# Patient Record
Sex: FEMALE | Race: ASIAN | NOT HISPANIC OR LATINO | URBAN - METROPOLITAN AREA
[De-identification: names, ages, dates, MRNs, and addresses within clinical notes are randomized per-mention and may not be internally consistent; named-entity substitution may affect disease eponyms.]

---

## 2018-10-11 ENCOUNTER — RADIANT APPOINTMENT (OUTPATIENT)
Dept: GENERAL RADIOLOGY | Facility: CLINIC | Age: 58
End: 2018-10-11
Attending: INTERNAL MEDICINE
Payer: COMMERCIAL

## 2018-10-11 DIAGNOSIS — J45.30 MILD PERSISTENT ASTHMA WITHOUT COMPLICATION: ICD-10-CM

## 2019-06-28 ENCOUNTER — HOSPITAL ENCOUNTER (OUTPATIENT)
Dept: GENERAL RADIOLOGY | Facility: CLINIC | Age: 59
Discharge: HOME | End: 2019-06-28
Attending: INTERNAL MEDICINE | Admitting: INTERNAL MEDICINE
Payer: COMMERCIAL

## 2019-06-28 DIAGNOSIS — M25.562 LEFT KNEE PAIN: ICD-10-CM

## 2019-06-28 PROCEDURE — 73560 X-RAY EXAM OF KNEE 1 OR 2: CPT | Mod: LT

## 2019-08-05 ENCOUNTER — MEDICAL CORRESPONDENCE (OUTPATIENT)
Dept: HEALTH INFORMATION MANAGEMENT | Facility: CLINIC | Age: 59
End: 2019-08-05

## 2019-08-14 ENCOUNTER — HOSPITAL ENCOUNTER (OUTPATIENT)
Dept: ULTRASOUND IMAGING | Facility: CLINIC | Age: 59
Discharge: HOME | End: 2019-08-14
Attending: INTERNAL MEDICINE | Admitting: INTERNAL MEDICINE
Payer: COMMERCIAL

## 2019-08-14 DIAGNOSIS — N93.9 ABNORMAL UTERINE AND VAGINAL BLEEDING, UNSPECIFIED: ICD-10-CM

## 2019-08-14 LAB — RADIOLOGIST FLAGS: NORMAL

## 2019-08-14 PROCEDURE — 76830 TRANSVAGINAL US NON-OB: CPT

## 2019-09-20 ENCOUNTER — OFFICE VISIT (OUTPATIENT)
Dept: OBGYN | Facility: CLINIC | Age: 59
End: 2019-09-20
Attending: OBSTETRICS & GYNECOLOGY
Payer: COMMERCIAL

## 2019-09-20 VITALS — SYSTOLIC BLOOD PRESSURE: 130 MMHG | HEART RATE: 73 BPM | DIASTOLIC BLOOD PRESSURE: 83 MMHG

## 2019-09-20 DIAGNOSIS — N95.0 POSTMENOPAUSAL BLEEDING: Primary | ICD-10-CM

## 2019-09-20 LAB
ALBUMIN UR-MCNC: NEGATIVE MG/DL
APPEARANCE UR: CLEAR
BILIRUB UR QL STRIP: NEGATIVE
COLOR UR AUTO: YELLOW
GLUCOSE UR STRIP-MCNC: NEGATIVE MG/DL
HGB UR QL STRIP: ABNORMAL
KETONES UR STRIP-MCNC: NEGATIVE MG/DL
LEUKOCYTE ESTERASE UR QL STRIP: ABNORMAL
NITRATE UR QL: NEGATIVE
PH UR STRIP: 7.5 PH (ref 5–7)
SP GR UR STRIP: 1.01 (ref 1–1.03)
UROBILINOGEN UR STRIP-ACNC: 0.2 EU/DL (ref 0.2–1)

## 2019-09-20 PROCEDURE — G0145 SCR C/V CYTO,THINLAYER,RESCR: HCPCS | Performed by: OBSTETRICS & GYNECOLOGY

## 2019-09-20 PROCEDURE — 81003 URINALYSIS AUTO W/O SCOPE: CPT

## 2019-09-20 PROCEDURE — 88305 TISSUE EXAM BY PATHOLOGIST: CPT | Performed by: OBSTETRICS & GYNECOLOGY

## 2019-09-20 PROCEDURE — G0476 HPV COMBO ASSAY CA SCREEN: HCPCS | Performed by: OBSTETRICS & GYNECOLOGY

## 2019-09-20 PROCEDURE — G0123 SCREEN CERV/VAG THIN LAYER: HCPCS | Performed by: OBSTETRICS & GYNECOLOGY

## 2019-09-20 PROCEDURE — 58100 BIOPSY OF UTERUS LINING: CPT | Mod: ZF | Performed by: OBSTETRICS & GYNECOLOGY

## 2019-09-20 RX ORDER — LOSARTAN POTASSIUM 50 MG/1
TABLET ORAL
Refills: 3 | COMMUNITY
Start: 2018-10-11

## 2019-09-20 RX ORDER — FOLIC ACID 1 MG/1
1000 TABLET ORAL DAILY
Refills: 3 | COMMUNITY
Start: 2019-09-01

## 2019-09-20 RX ORDER — UREA 10 %
LOTION (ML) TOPICAL
Refills: 3 | COMMUNITY
Start: 2019-04-22

## 2019-09-20 RX ORDER — MOMETASONE FUROATE AND FORMOTEROL FUMARATE DIHYDRATE 100; 5 UG/1; UG/1
AEROSOL RESPIRATORY (INHALATION)
Refills: 3 | COMMUNITY
Start: 2019-09-01

## 2019-09-20 SDOH — HEALTH STABILITY: MENTAL HEALTH: HOW OFTEN DO YOU HAVE A DRINK CONTAINING ALCOHOL?: NEVER

## 2019-09-20 NOTE — PROGRESS NOTES
"Subjective:     Maria T Pineda is a 59 year old  here for evaluation of painless AUB s/p LMP ~ at age 52. Bleeding has been intermittent throughout the past 7 years. It is our understanding that she sought a provider for this intermittent bleeding earlier (approximately 2 years ago). She was diagnosed with an \"infection in the urine,\" which resolved without complications. She describes her bleeding as \"pink\" and is most noticeable when she urinates and when she wipes. She denies fevers, night sweats, hot flashes, cramping/abdominal pain, genital rashes, or dysuria. She is positive for constipation     Of note, she is from [facility]. She moved to [place] a year ago with her . She understands very little English. iPad translation services were used.     This note by Denise Gonsalez kindly documents Maria T's HPI. Please see my note for further details of this encounter.    Heide Mathur MD, MSCI  "

## 2019-09-20 NOTE — LETTER
"2019       RE: Maria T Pineda  [address]  [facility] [address]     Dear Colleague,    Thank you for referring your patient, Maria T Pineda, to the [facility] at [facility]. Please see a copy of my visit note below.    SUBJECTIVE   Maria T Pineda is a 59 year old postmenopausal  who presents with postmenopausal bleeding x 7 years. This history was taken using an over-the-phone . Maria T states her period stopped 7 years ago, but since then she has had ongoing occasional spotting or pink discharge. She notices it when she wipes after urinating. [place] from [place]. She has been seen for this issue in [place], and potentially was tested for a urinary tract infection, but does not mentions any screening for  cancers. She denies cramping/abdominal pain, fevers/sweats, dysuria, or hot flashes. She has noticed constipation [place].     Gynecologic History  No LMP recorded. Patient is postmenopausal. Last period in .     Menstrual History:  Thinks she perhaps has had 1 pap smear in [place], which was normal.    Obstetric History  OB History   No data available     2 SAB  2     Past Medical History  Past Medical History:   Diagnosis Date     Asthma     Pt describes having asthma and taking \"nasal spray\" daily for relief      Chronic knee pain     bilateral, takes ibuprofen for the pain      History of kidney problems     diagnosed and treated herbally in [place] s/p resolved per pt      Past Surgical History  No past surgical history on file.  Possibly colonoscopy (?) done in [facility] - unclear description from [facility]    Medications  Current Outpatient Medications   Medication     folic acid (FOLVITE) 1 MG tablet     losartan (COZAAR) 50 MG tablet     DULERA 100-5 MCG/ACT inhaler     omeprazole (PRILOSEC) 20 MG DR capsule     Pyridoxine HCl (VITAMIN B6) 50 MG TABS "     ranitidine (ZANTAC) 150 MG tablet     No current facility-administered medications for this visit.    reports using ibuprofen daily    Allergies   No Known Allergies    Social History  Social History     Tobacco Use     Smoking status: Never Smoker     Smokeless tobacco: Never Used   Substance Use Topics     Alcohol use: Never     Frequency: Never     Drug use: Never   [place] from [place].    Family History  Family History   Problem Relation Age of Onset     Lung Cancer Father      Ovarian Cancer Paternal Aunt    No breast or uterine cancer.    Review of Systems  C: NEGATIVE for fever, chills, unplanned weight loss  HEENT: NEGATIVE for vision changes, NEGATIVE for ear, mouth and throat problems  R: NEGATIVE for significant cough or SOB  B: NEGATIVE for masses, tenderness or discharge  CV: NEGATIVE for chest pain, palpitations   GI: NEGATIVE for nausea, abdominal pain,or heartburn  : NEGATIVE for frequency, dysuria, or hematuria, or change in bladder habits  M: NEGATIVE for significant arthralgias or myalgia  N: NEGATIVE for weakness, dizziness or paresthesias or headache  E: NEGATIVE for temperature intolerance, skin/hair changes  I: NEGATIVE for worrisome rashes, moles or lesions  P: NEGATIVE for changes in mood or affect    OBJECTIVE   /83   Pulse 73   BMI: There is no height or weight on file to calculate BMI.  General:  Alert, no distress   Head:  Normocephalic, without obvious abnormality   Lungs:  Clear to auscultation bilaterally   Heart:  Regular rate and rhythm, no murmur   Abdomen:  Soft, non-tender, non-distended, bowel sounds normal   Pelvic: -nefg  -bladder wnl, well supported  -vagina normal without discharge  -cervix normal in appearance, no masses  -uterus 5 wk size, AV, mobile, NT  -no adnexal masses, NT  -anus wnl   Extremities:  normal     Endometrial Biopsy Procedure Note    Thi and I discussed the risks and benefits of performing an in-office EMB today and Thi  agreed. We performed a time-out and confirmed Maria T's name, , and procedure. I then assisted the patient into lithotomy position. I placed a speculum into the vagina and collected a pap smear. Cervix was without lesion. I cleansed the cervix with iodine. I placed a single-toothed tenaculum on the anterior lip of the cervix. With 2 passes of the endometrial biopsy pipelle I collected scant endometrial tissue. I then removed the tenaculum. The cervix was hemostatic. I then removed the speculum. Maria T tolerated the procedure well.    Pelvic US 19:  IMPRESSION: Hypervascular intrauterine mixed echogenic mass, likely  polyp versus submucosal fibroid. However, cannot exclude neoplasm.  Further evaluation is warranted to exclude malignancy.     Urine dip in the office today: neg glu, neg ana, neg ket, trace blood, pH 7.5, SG 1.015, neg pro, neg nit, trace saravanan, 0.2 EU/dL uro (urine collected after EMB performed)    ASSESSMENT   Maria T Pineda is a 59 year old postmenopausal  here for PMB.    PLAN   (N95.0) Postmenopausal bleeding  (primary encounter diagnosis)  Comment: Maria T and I discussed that there are 4 common causes of PMB: UTI, vaginal atrophy, cervical pathology, and uterine pathology.     Plan: Obtaining, preparing and conveyance of cervical        or vaginal smear to laboratory., Pap imaged         thin layer screen with HPV - recommended age 30        - 65 years (select HPV order below),         ENDOMETRIAL BIOPSY w/o CERVICAL DILATION,         Surgical pathology exam          Urine dip w/o evidence of UTI today. No concerning vaginal lesions/atrophy. Pap smear and EMB done today to r/o pre-cancerous/cancerous lesion.     Given language barrier, Maria T will return in 2 weeks to discuss results. Will recommend operative hysteroscopy with myosure, D&C at that time if remainder of testing negative to remove endometrial polyp seen on US.     Heide Mathur MD, MSCI    Division of Obstetrics,  "Gynecology, Midwifery, and Family Planning    Subjective:     Maria T Pineda is a 59 year old  here for evaluation of painless AUB s/p LMP ~ at age 52. Bleeding has been intermittent throughout the past 7 years. It is our understanding that she sought a provider for this intermittent bleeding earlier (approximately 2 years ago). She was diagnosed with an \"infection in the urine,\" which resolved without complications. She describes her bleeding as \"pink\" and is most noticeable when she urinates and when she wipes. She denies fevers, night sweats, hot flashes, cramping/abdominal pain, genital rashes, or dysuria. She is positive for constipation     Of note, she is from [facility]. She moved to [place] a year ago with her . She understands very little English. iPad translation services were used.     This note by Denise Gonsalez kindly documents Maria T's HPI. Please see my note for further details of this encounter.    Heide Mathur MD, MSCI      "

## 2019-09-20 NOTE — PROGRESS NOTES
"SUBJECTIVE   Maria T Pineda is a 59 year old postmenopausal  who presents with postmenopausal bleeding x 7 years. This history was taken using an over-the-phone . Maria T states her period stopped 7 years ago, but since then she has had ongoing occasional spotting or pink discharge. She notices it when she wipes after urinating. [place]. She has been seen for this issue in [place], and potentially was tested for a urinary tract infection, but does not mentions any screening for  cancers. She denies cramping/abdominal pain, fevers/sweats, dysuria, or hot flashes. She has noticed constipation since [place].     Gynecologic History  No LMP recorded. Patient is postmenopausal. Last period in .     Menstrual History:  Thinks she perhaps has had 1 pap smear in [place], which was normal.    Obstetric History  OB History   No data available     2 SAB  2     Past Medical History  Past Medical History:   Diagnosis Date     Asthma     Pt describes having asthma and taking \"nasal spray\" daily for relief      Chronic knee pain     bilateral, takes ibuprofen for the pain      History of kidney problems     diagnosed and treated herbally in [place] s/p resolved per pt        Past Surgical History  No past surgical history on file.  Possibly colonoscopy (?) done in [place] - unclear description from [facility]    Medications  Current Outpatient Medications   Medication     folic acid (FOLVITE) 1 MG tablet     losartan (COZAAR) 50 MG tablet     DULERA 100-5 MCG/ACT inhaler     omeprazole (PRILOSEC) 20 MG DR capsule     Pyridoxine HCl (VITAMIN B6) 50 MG TABS     ranitidine (ZANTAC) 150 MG tablet     No current facility-administered medications for this visit.    reports using ibuprofen daily    Allergies   No Known Allergies    Social History  Social History     Tobacco Use     Smoking status: Never Smoker     Smokeless tobacco: Never Used "   Substance Use Topics     Alcohol use: Never     Frequency: Never     Drug use: Never   [place] from [place].    Family History  Family History   Problem Relation Age of Onset     Lung Cancer Father      Ovarian Cancer Paternal Aunt    No breast or uterine cancer.    Review of Systems  C: NEGATIVE for fever, chills, unplanned weight loss  HEENT: NEGATIVE for vision changes, NEGATIVE for ear, mouth and throat problems  R: NEGATIVE for significant cough or SOB  B: NEGATIVE for masses, tenderness or discharge  CV: NEGATIVE for chest pain, palpitations   GI: NEGATIVE for nausea, abdominal pain,or heartburn  : NEGATIVE for frequency, dysuria, or hematuria, or change in bladder habits  M: NEGATIVE for significant arthralgias or myalgia  N: NEGATIVE for weakness, dizziness or paresthesias or headache  E: NEGATIVE for temperature intolerance, skin/hair changes  I: NEGATIVE for worrisome rashes, moles or lesions  P: NEGATIVE for changes in mood or affect    OBJECTIVE   /83   Pulse 73   BMI: There is no height or weight on file to calculate BMI.  General:  Alert, no distress   Head:  Normocephalic, without obvious abnormality   Lungs:  Clear to auscultation bilaterally   Heart:  Regular rate and rhythm, no murmur   Abdomen:  Soft, non-tender, non-distended, bowel sounds normal   Pelvic: -nefg  -bladder wnl, well supported  -vagina normal without discharge  -cervix normal in appearance, no masses  -uterus 5 wk size, AV, mobile, NT  -no adnexal masses, NT  -anus wnl   Extremities:  normal     Endometrial Biopsy Procedure Note    Thi and I discussed the risks and benefits of performing an in-office EMB today and Thi agreed. We performed a time-out and confirmed Thi's name, , and procedure. I then assisted the patient into lithotomy position. I placed a speculum into the vagina and collected a pap smear. Cervix was without lesion. I cleansed the cervix with iodine. I placed a single-toothed tenaculum  on the anterior lip of the cervix. With 2 passes of the endometrial biopsy pipelle I collected scant endometrial tissue. I then removed the tenaculum. The cervix was hemostatic. I then removed the speculum. Maria T tolerated the procedure well.    Pelvic US 19:  IMPRESSION: Hypervascular intrauterine mixed echogenic mass, likely  polyp versus submucosal fibroid. However, cannot exclude neoplasm.  Further evaluation is warranted to exclude malignancy.     Urine dip in the office today: neg glu, neg ana, neg ket, trace blood, pH 7.5, SG 1.015, neg pro, neg nit, trace saravanan, 0.2 EU/dL uro (urine collected after EMB performed)    ASSESSMENT   Maria T Pineda is a 59 year old postmenopausal  here for PMB.    PLAN   (N95.0) Postmenopausal bleeding  (primary encounter diagnosis)  Comment: Thi and I discussed that there are 4 common causes of PMB: UTI, vaginal atrophy, cervical pathology, and uterine pathology.     Plan: Obtaining, preparing and conveyance of cervical        or vaginal smear to laboratory., Pap imaged         thin layer screen with HPV - recommended age 30        - 65 years (select HPV order below),         ENDOMETRIAL BIOPSY w/o CERVICAL DILATION,         Surgical pathology exam          Urine dip w/o evidence of UTI today. No concerning vaginal lesions/atrophy. Pap smear and EMB done today to r/o pre-cancerous/cancerous lesion.     Given language barrier, Thi will return in 2 weeks to discuss results. Will recommend operative hysteroscopy with myosure, D&C at that time if remainder of testing negative to remove endometrial polyp seen on US.     Heide Mathur MD, MSCI    Division of Obstetrics, Gynecology, Midwifery, and Family Planning

## 2019-09-25 LAB
COPATH REPORT: NORMAL
PAP: NORMAL

## 2019-09-27 LAB
FINAL DIAGNOSIS: NORMAL
HPV HR 12 DNA CVX QL NAA+PROBE: NEGATIVE
HPV16 DNA SPEC QL NAA+PROBE: NEGATIVE
HPV18 DNA SPEC QL NAA+PROBE: NEGATIVE
SPECIMEN DESCRIPTION: NORMAL
SPECIMEN SOURCE CVX/VAG CYTO: NORMAL

## 2019-09-28 LAB — COPATH REPORT: NORMAL

## 2019-10-07 ENCOUNTER — ANCILLARY PROCEDURE (OUTPATIENT)
Dept: ULTRASOUND IMAGING | Facility: CLINIC | Age: 59
End: 2019-10-07
Attending: OBSTETRICS & GYNECOLOGY
Payer: COMMERCIAL

## 2019-10-07 ENCOUNTER — OFFICE VISIT (OUTPATIENT)
Dept: OBGYN | Facility: CLINIC | Age: 59
End: 2019-10-07
Attending: OBSTETRICS & GYNECOLOGY
Payer: COMMERCIAL

## 2019-10-07 VITALS — SYSTOLIC BLOOD PRESSURE: 143 MMHG | WEIGHT: 139.1 LBS | HEART RATE: 72 BPM | DIASTOLIC BLOOD PRESSURE: 85 MMHG

## 2019-10-07 DIAGNOSIS — N95.0 POSTMENOPAUSAL BLEEDING: Primary | ICD-10-CM

## 2019-10-07 PROCEDURE — 76830 TRANSVAGINAL US NON-OB: CPT

## 2019-10-07 PROCEDURE — G0463 HOSPITAL OUTPT CLINIC VISIT: HCPCS

## 2019-10-07 ASSESSMENT — PATIENT HEALTH QUESTIONNAIRE - PHQ9
5. POOR APPETITE OR OVEREATING: NEARLY EVERY DAY
SUM OF ALL RESPONSES TO PHQ QUESTIONS 1-9: 11

## 2019-10-07 ASSESSMENT — ANXIETY QUESTIONNAIRES
6. BECOMING EASILY ANNOYED OR IRRITABLE: NOT AT ALL
2. NOT BEING ABLE TO STOP OR CONTROL WORRYING: NEARLY EVERY DAY
GAD7 TOTAL SCORE: 14
7. FEELING AFRAID AS IF SOMETHING AWFUL MIGHT HAPPEN: NEARLY EVERY DAY
1. FEELING NERVOUS, ANXIOUS, OR ON EDGE: NEARLY EVERY DAY
3. WORRYING TOO MUCH ABOUT DIFFERENT THINGS: SEVERAL DAYS
5. BEING SO RESTLESS THAT IT IS HARD TO SIT STILL: SEVERAL DAYS

## 2019-10-07 NOTE — PROGRESS NOTES
"Gyn Clinic Visit Note  10/7/2019    S: Maria T Pineda is a 59 year old her to discuss U/S results and possible surgery.  She is here with an . She had PMB.  Dr. Mathur recommended an office visit to explain the findings and to establish a plan (planned for surgery).  U/S and endo bx results below.      U/S 8/14/2019:  FINDINGS:  The uterus measures 3.3 cm x 7.8 cm x 4.4 cm. There is an  approximately 2.0 x 1.6 x 1.2 cm hypervascular mixed echogenic mass in  the mid myometrium/endometrium. The endometrial stripe is normal in  thickness measuring 3 mm adjacent to this lesion.     The right ovary demonstrates normal follicular appearance and measures  0.8 cm x 2.2 cm x 1.1 cm. The left ovary is not-well visualized. No  adnexal mass is seen.     There is no simple free fluid in the pelvis.                                                                      IMPRESSION: Hypervascular intrauterine mixed echogenic mass, likely  polyp versus submucosal fibroid. However, cannot exclude neoplasm.  Further evaluation is warranted to exclude malignancy.    9/20/2019:    FINAL DIAGNOSIS:   Endometrium, biopsy:   -Fragments of atrophic endometrium   -Superficial strips of endocervical epithelium with focal squamous   metaplasia   -Negative for hyperplasia, atypia and malignancy         O: BP (!) 143/85 (BP Location: Left arm, Patient Position: Chair)   Pulse 72   Wt 63.1 kg (139 lb 1.6 oz)   Breastfeeding? No        A/P:  Maria T Pineda is a 59 year old y/o  here today for f/u PMB: benign endo bx and abnormal pelvic U/S.  I have reviewed the pictures here and feel that the \"mass\" is most likely a intramural fibroid with minimal impingement on the cavity.  I would like the pt to RTO for another U/S in the clinic to confirm this.  If this is the case then an operative hysteroscopy would not be necessary.    In office U/S performed today: Stripe clearly seen 1.34 mm with 1.75x1.28x1.85 cm intramural fibroid; not " impinging on stripe.    In view of these findings and negative endo bx; she does not need anymore follow up. She was very happy with this.  She was told that if she experiences any more bleeding she should RTO.    I spent a total of 15  minutes face to face with Yjo  during today's office visit. Over 50% of this time was spent counseling the patient and/or coordinating care regarding her plan for PMB.

## 2019-10-07 NOTE — NURSING NOTE
Chief Complaint   Patient presents with     Follow Up     Follow up to discuss result       See DEBORAH Perez 10/7/2019

## 2019-10-07 NOTE — LETTER
"10/7/2019       RE: Maria T [address]  [facility] [id number]     Dear Colleague,    Thank you for referring your patient, Maria T Pineda, to the [facility] at [facility]. Please see a copy of my visit note below.    Gyn Clinic Visit Note  10/7/2019    S: Maria T Pineda is a 59 year old her to discuss U/S results and possible surgery.  She is here with an . She had PMB.  Dr. Mathur recommended an office visit to explain the findings and to establish a plan (planned for surgery).  U/S and endo bx results below.      U/S 8/14/2019:  FINDINGS:  The uterus measures 3.3 cm x 7.8 cm x 4.4 cm. There is an  approximately 2.0 x 1.6 x 1.2 cm hypervascular mixed echogenic mass in  the mid myometrium/endometrium. The endometrial stripe is normal in  thickness measuring 3 mm adjacent to this lesion.     The right ovary demonstrates normal follicular appearance and measures  0.8 cm x 2.2 cm x 1.1 cm. The left ovary is not-well visualized. No  adnexal mass is seen.     There is no simple free fluid in the pelvis.                                                                IMPRESSION: Hypervascular intrauterine mixed echogenic mass, likely  polyp versus submucosal fibroid. However, cannot exclude neoplasm.  Further evaluation is warranted to exclude malignancy.    9/20/2019:    FINAL DIAGNOSIS:   Endometrium, biopsy:   -Fragments of atrophic endometrium   -Superficial strips of endocervical epithelium with focal squamous   metaplasia   -Negative for hyperplasia, atypia and malignancy     O: BP (!) 143/85 (BP Location: Left arm, Patient Position: Chair)   Pulse 72   Wt 63.1 kg (139 lb 1.6 oz)   Breastfeeding? No      A/P:  Maria T Pineda is a 59 year old y/o  here today for f/u PMB: benign endo bx and abnormal pelvic U/S.  I have reviewed the pictures here and feel that the \"mass\" is most likely a intramural fibroid with minimal " impingement on the cavity.  I would like the pt to RTO for another U/S in the clinic to confirm this.  If this is the case then an operative hysteroscopy would not be necessary.    In office U/S performed today: Stripe clearly seen 1.34 mm with 1.75x1.28x1.85 cm intramural fibroid; not impinging on stripe.    In view of these findings and negative endo bx; she does not need anymore follow up. She was very happy with this.  She was told that if she experiences any more bleeding she should RTO.    I spent a total of 15  minutes face to face with Yjo  during today's office visit. Over 50% of this time was spent counseling the patient and/or coordinating care regarding her plan for PMB.      Again, thank you for allowing me to participate in the care of your patient.      Sincerely,    Sol Lau MD

## 2019-10-08 ASSESSMENT — ANXIETY QUESTIONNAIRES: GAD7 TOTAL SCORE: 14

## 2021-09-16 ENCOUNTER — HOSPITAL ENCOUNTER (OUTPATIENT)
Dept: CARDIOLOGY | Facility: CLINIC | Age: 61
Discharge: HOME | End: 2021-09-16
Attending: FAMILY MEDICINE | Admitting: FAMILY MEDICINE
Payer: COMMERCIAL

## 2021-09-16 VITALS — BODY MASS INDEX: 24.27 KG/M2 | WEIGHT: 137 LBS | HEIGHT: 63 IN

## 2021-09-16 DIAGNOSIS — R07.9 CHEST PAIN, UNSPECIFIED: ICD-10-CM

## 2021-09-16 PROCEDURE — 93321 DOPPLER ECHO F-UP/LMTD STD: CPT | Mod: 26 | Performed by: INTERNAL MEDICINE

## 2021-09-16 PROCEDURE — 93018 CV STRESS TEST I&R ONLY: CPT | Performed by: INTERNAL MEDICINE

## 2021-09-16 PROCEDURE — C8928 TTE W OR W/O FOL W/CON,STRES: HCPCS

## 2021-09-16 PROCEDURE — 255N000002 HC RX 255 OP 636: Performed by: INTERNAL MEDICINE

## 2021-09-16 PROCEDURE — 93325 DOPPLER ECHO COLOR FLOW MAPG: CPT | Mod: 26 | Performed by: INTERNAL MEDICINE

## 2021-09-16 PROCEDURE — 93321 DOPPLER ECHO F-UP/LMTD STD: CPT | Mod: TC

## 2021-09-16 PROCEDURE — 250N000011 HC RX IP 250 OP 636: Performed by: INTERNAL MEDICINE

## 2021-09-16 PROCEDURE — 250N000009 HC RX 250: Performed by: INTERNAL MEDICINE

## 2021-09-16 PROCEDURE — 93350 STRESS TTE ONLY: CPT | Mod: 26 | Performed by: INTERNAL MEDICINE

## 2021-09-16 PROCEDURE — 93016 CV STRESS TEST SUPVJ ONLY: CPT | Performed by: INTERNAL MEDICINE

## 2021-09-16 RX ORDER — DOBUTAMINE HYDROCHLORIDE 200 MG/100ML
10-50 INJECTION INTRAVENOUS CONTINUOUS
Status: SHIPPED | OUTPATIENT
Start: 2021-09-16 | End: 2021-09-16

## 2021-09-16 RX ORDER — METOPROLOL TARTRATE 1 MG/ML
1-20 INJECTION, SOLUTION INTRAVENOUS
Status: ACTIVE | OUTPATIENT
Start: 2021-09-16 | End: 2021-09-16

## 2021-09-16 RX ORDER — ATROPINE SULFATE 0.4 MG/ML
.2-2 AMPUL (ML) INJECTION
Status: COMPLETED | OUTPATIENT
Start: 2021-09-16 | End: 2021-09-16

## 2021-09-16 RX ADMIN — PERFLUTREN 8 ML: 6.52 INJECTION, SUSPENSION INTRAVENOUS at 14:09

## 2021-09-16 RX ADMIN — ATROPINE SULFATE 0.2 MG: 0.4 INJECTION, SOLUTION INTRAMUSCULAR; INTRAVENOUS; SUBCUTANEOUS at 14:05

## 2021-09-16 RX ADMIN — METOPROLOL TARTRATE 3 MG: 1 INJECTION, SOLUTION INTRAVENOUS at 14:08

## 2021-09-16 RX ADMIN — DOBUTAMINE IN DEXTROSE 10 MCG/KG/MIN: 200 INJECTION, SOLUTION INTRAVENOUS at 13:55

## 2021-09-16 ASSESSMENT — MIFFLIN-ST. JEOR: SCORE: 1165.56

## 2021-09-16 NOTE — PROGRESS NOTES
Pt here for dobutamine stress test.  Test, meds and side effects reviewed with patient.  Achieved target HR at 40 mcg Dobutamine and a total of 0.2 mg IV atropine.  Gave a total of 3 mg IV Metoprolol to bring HR back to baseline.  Post monitoring complete and VSS.  Pt escorted out to the gold waiting room.

## 2021-10-05 ENCOUNTER — ANCILLARY PROCEDURE (OUTPATIENT)
Dept: MAMMOGRAPHY | Facility: CLINIC | Age: 61
End: 2021-10-05
Attending: INTERNAL MEDICINE
Payer: COMMERCIAL

## 2021-10-05 DIAGNOSIS — Z12.31 VISIT FOR SCREENING MAMMOGRAM: ICD-10-CM

## 2021-10-05 PROCEDURE — 77067 SCR MAMMO BI INCL CAD: CPT

## 2021-10-05 PROCEDURE — 77067 SCR MAMMO BI INCL CAD: CPT | Mod: 26 | Performed by: STUDENT IN AN ORGANIZED HEALTH CARE EDUCATION/TRAINING PROGRAM

## 2021-11-10 ENCOUNTER — LAB REQUISITION (OUTPATIENT)
Dept: LAB | Facility: CLINIC | Age: 61
End: 2021-11-10
Payer: COMMERCIAL

## 2021-11-10 DIAGNOSIS — Z00.00 ENCOUNTER FOR GENERAL ADULT MEDICAL EXAMINATION WITHOUT ABNORMAL FINDINGS: ICD-10-CM

## 2021-11-10 LAB — HEMOCCULT STL QL IA: NEGATIVE

## 2021-11-10 PROCEDURE — 82274 ASSAY TEST FOR BLOOD FECAL: CPT | Mod: ORL | Performed by: FAMILY MEDICINE

## 2022-02-15 ENCOUNTER — LAB REQUISITION (OUTPATIENT)
Dept: LAB | Facility: CLINIC | Age: 62
End: 2022-02-15

## 2022-02-15 DIAGNOSIS — D64.9 ANEMIA, UNSPECIFIED: ICD-10-CM

## 2022-02-15 DIAGNOSIS — Z00.00 ENCOUNTER FOR GENERAL ADULT MEDICAL EXAMINATION WITHOUT ABNORMAL FINDINGS: ICD-10-CM

## 2022-02-15 DIAGNOSIS — R10.13 EPIGASTRIC PAIN: ICD-10-CM

## 2022-02-15 DIAGNOSIS — E11.9 TYPE 2 DIABETES MELLITUS WITHOUT COMPLICATIONS (H): ICD-10-CM

## 2022-02-15 DIAGNOSIS — R63.4 ABNORMAL WEIGHT LOSS: ICD-10-CM

## 2022-02-15 LAB
AMYLASE SERPL-CCNC: 78 U/L (ref 30–110)
CHOLEST SERPL-MCNC: 181 MG/DL
CRP SERPL-MCNC: <2.9 MG/L (ref 0–8)
FASTING STATUS PATIENT QL REPORTED: YES
FERRITIN SERPL-MCNC: 205 NG/ML (ref 8–252)
HDLC SERPL-MCNC: 51 MG/DL
IRON SATN MFR SERPL: 36 % (ref 15–46)
IRON SERPL-MCNC: 87 UG/DL (ref 35–180)
LDLC SERPL CALC-MCNC: 109 MG/DL
LIPASE SERPL-CCNC: 155 U/L (ref 73–393)
NONHDLC SERPL-MCNC: 130 MG/DL
T4 FREE SERPL-MCNC: 1.24 NG/DL (ref 0.76–1.46)
TIBC SERPL-MCNC: 240 UG/DL (ref 240–430)
TRIGL SERPL-MCNC: 103 MG/DL
TSH SERPL DL<=0.005 MIU/L-ACNC: 0.39 MU/L (ref 0.4–4)

## 2022-02-15 PROCEDURE — 84439 ASSAY OF FREE THYROXINE: CPT | Performed by: FAMILY MEDICINE

## 2022-02-15 PROCEDURE — 82150 ASSAY OF AMYLASE: CPT | Performed by: FAMILY MEDICINE

## 2022-02-15 PROCEDURE — 80061 LIPID PANEL: CPT | Performed by: FAMILY MEDICINE

## 2022-02-15 PROCEDURE — 83690 ASSAY OF LIPASE: CPT | Performed by: FAMILY MEDICINE

## 2022-02-15 PROCEDURE — 83550 IRON BINDING TEST: CPT | Performed by: FAMILY MEDICINE

## 2022-02-15 PROCEDURE — 86803 HEPATITIS C AB TEST: CPT | Performed by: FAMILY MEDICINE

## 2022-02-15 PROCEDURE — 86706 HEP B SURFACE ANTIBODY: CPT | Performed by: FAMILY MEDICINE

## 2022-02-15 PROCEDURE — 87340 HEPATITIS B SURFACE AG IA: CPT | Performed by: FAMILY MEDICINE

## 2022-02-15 PROCEDURE — 86140 C-REACTIVE PROTEIN: CPT | Performed by: FAMILY MEDICINE

## 2022-02-15 PROCEDURE — 82728 ASSAY OF FERRITIN: CPT | Performed by: FAMILY MEDICINE

## 2022-02-15 PROCEDURE — 84443 ASSAY THYROID STIM HORMONE: CPT | Performed by: FAMILY MEDICINE

## 2022-02-16 LAB
HBV SURFACE AB SERPL IA-ACNC: 4.68 M[IU]/ML
HBV SURFACE AG SERPL QL IA: NONREACTIVE
HCV AB SERPL QL IA: NONREACTIVE

## 2022-05-24 ENCOUNTER — LAB REQUISITION (OUTPATIENT)
Dept: LAB | Facility: CLINIC | Age: 62
End: 2022-05-24

## 2022-05-24 DIAGNOSIS — R94.6 ABNORMAL RESULTS OF THYROID FUNCTION STUDIES: ICD-10-CM

## 2022-05-24 DIAGNOSIS — D56.3 THALASSEMIA MINOR: ICD-10-CM

## 2022-05-24 DIAGNOSIS — R76.8 OTHER SPECIFIED ABNORMAL IMMUNOLOGICAL FINDINGS IN SERUM: ICD-10-CM

## 2022-05-24 LAB
FOLATE SERPL-MCNC: 51.5 NG/ML
T3 SERPL-MCNC: 99 NG/DL (ref 60–181)
T4 FREE SERPL-MCNC: 1.26 NG/DL (ref 0.76–1.46)
TSH SERPL DL<=0.005 MIU/L-ACNC: 0.33 MU/L (ref 0.4–4)
VIT B12 SERPL-MCNC: 1093 PG/ML (ref 193–986)

## 2022-05-24 PROCEDURE — 84443 ASSAY THYROID STIM HORMONE: CPT | Performed by: FAMILY MEDICINE

## 2022-05-24 PROCEDURE — 86704 HEP B CORE ANTIBODY TOTAL: CPT | Performed by: FAMILY MEDICINE

## 2022-05-24 PROCEDURE — 87340 HEPATITIS B SURFACE AG IA: CPT | Performed by: FAMILY MEDICINE

## 2022-05-24 PROCEDURE — 82746 ASSAY OF FOLIC ACID SERUM: CPT | Performed by: FAMILY MEDICINE

## 2022-05-24 PROCEDURE — 84445 ASSAY OF TSI GLOBULIN: CPT | Performed by: FAMILY MEDICINE

## 2022-05-24 PROCEDURE — 84439 ASSAY OF FREE THYROXINE: CPT | Performed by: FAMILY MEDICINE

## 2022-05-24 PROCEDURE — 84480 ASSAY TRIIODOTHYRONINE (T3): CPT | Performed by: FAMILY MEDICINE

## 2022-05-24 PROCEDURE — 82607 VITAMIN B-12: CPT | Performed by: FAMILY MEDICINE

## 2022-05-25 LAB
HBV CORE AB SERPL QL IA: NONREACTIVE
HBV SURFACE AG SERPL QL IA: NONREACTIVE

## 2022-05-31 LAB — TSI SER-ACNC: <1 TSI INDEX

## 2022-06-02 ENCOUNTER — TRANSCRIBE ORDERS (OUTPATIENT)
Dept: OTHER | Age: 62
End: 2022-06-02
Payer: COMMERCIAL

## 2022-06-02 ENCOUNTER — APPOINTMENT (OUTPATIENT)
Dept: INTERPRETER SERVICES | Facility: CLINIC | Age: 62
End: 2022-06-02
Payer: COMMERCIAL

## 2022-06-02 DIAGNOSIS — E11.9 CONTROLLED TYPE 2 DIABETES MELLITUS WITHOUT COMPLICATION, WITHOUT LONG-TERM CURRENT USE OF INSULIN (H): Primary | ICD-10-CM

## 2022-06-04 ENCOUNTER — OFFICE VISIT (OUTPATIENT)
Dept: OPHTHALMOLOGY | Facility: CLINIC | Age: 62
End: 2022-06-04
Attending: STUDENT IN AN ORGANIZED HEALTH CARE EDUCATION/TRAINING PROGRAM
Payer: COMMERCIAL

## 2022-06-04 DIAGNOSIS — E11.9 CONTROLLED TYPE 2 DIABETES MELLITUS WITHOUT COMPLICATION, WITHOUT LONG-TERM CURRENT USE OF INSULIN (H): ICD-10-CM

## 2022-06-04 DIAGNOSIS — H53.453 PERIPHERAL VISION LOSS, BILATERAL: Primary | ICD-10-CM

## 2022-06-04 PROCEDURE — 92004 COMPRE OPH EXAM NEW PT 1/>: CPT | Performed by: STUDENT IN AN ORGANIZED HEALTH CARE EDUCATION/TRAINING PROGRAM

## 2022-06-04 ASSESSMENT — EXTERNAL EXAM - RIGHT EYE: OD_EXAM: NORMAL

## 2022-06-04 ASSESSMENT — TONOMETRY
OS_IOP_MMHG: 14
OD_IOP_MMHG: 14
IOP_METHOD: ICARE

## 2022-06-04 ASSESSMENT — REFRACTION_MANIFEST
OS_AXIS: 005
OS_SPHERE: -1.00
OD_ADD: +2.50
OD_CYLINDER: +1.00
OS_CYLINDER: +0.75
OS_ADD: +2.50
OD_AXIS: 173
OD_SPHERE: -1.00

## 2022-06-04 ASSESSMENT — CUP TO DISC RATIO
OD_RATIO: 0.3
OS_RATIO: 0.3

## 2022-06-04 ASSESSMENT — CONF VISUAL FIELD
METHOD: COUNTING FINGERS
OS_SUPERIOR_NASAL_RESTRICTION: 3
OD_SUPERIOR_NASAL_RESTRICTION: 3
OD_SUPERIOR_TEMPORAL_RESTRICTION: 3
OD_INFERIOR_NASAL_RESTRICTION: 3
OS_INFERIOR_TEMPORAL_RESTRICTION: 3
OD_INFERIOR_TEMPORAL_RESTRICTION: 3
OS_INFERIOR_NASAL_RESTRICTION: 3
OS_SUPERIOR_TEMPORAL_RESTRICTION: 3

## 2022-06-04 ASSESSMENT — VISUAL ACUITY
OS_PH_SC: 20/150
OD_SC: 20/70
OS_SC: 20/200
METHOD: SNELLEN - LINEAR

## 2022-06-04 ASSESSMENT — EXTERNAL EXAM - LEFT EYE: OS_EXAM: NORMAL

## 2022-06-04 NOTE — PROGRESS NOTES
CC -  Decreased vision     Maria T Pineda is a 62 year old female with POH of refractive error who is here for evaluation of blurry vision in each eye.     Past Medical History:  DM2  HTN  Asthma  Beta-thalassemia trait      Past Ocular Surgeries:  None  No LASIK      ASSESSMENT & PLAN    #Decrased vision, peripheral vision loss   - BCVA today OD 20/60; OS 20/150   - Color vision 7/11 right eye; 5/11 left eye   - Severe field constriction on kinetic confrontational field testing   - Father and brother wear glasses  06/04/22 - Pt notes she has always needed glasses, but with correction has had good vision in the past. No recent episodes of vision loss. She just assumed her vision loss was from diabetes. Exam fairly unremarkable - no bone spicules, no attenuation of vessels. Will refer to Dr. Caraballo for further evaluation.       # DM2 without retinopathy   - Will bring back to clinic for OCT Mac    # Beta-thalassemia trait    return to clinic: RTC 2-3 weeks for V/T/D with Optos, OCT Mac (with RNFL and nerve volume scan) and 30-2 OVF on arrival    : [id number]    ATTESTATION     Attending Attestation:    Complete documentation of historical and exam elements from today's encounter can be found in the full encounter summary report (not reduplicated in this progress note).  I personally obtained the chief complaint(s) and history of present illness.  I confirmed and edited as necessary the review of systems, past medical/surgical history, family history, social history, and examination findings as documented by others; and I examined the patient myself.  I personally reviewed the relevant tests, images, and reports as documented above.  I formulated and edited as necessary the assessment and plan and discussed the findings and management plan with the patient and family      Ravi Vo MD  Retina Fellow  Department of Ophthalmology  [facility]  Pager: [phone]

## 2022-06-04 NOTE — NURSING NOTE
Chief Complaints and History of Present Illnesses   Patient presents with     COMPREHENSIVE EYE EXAM     New Pt here for annual DM CEE.     Chief Complaint(s) and History of Present Illness(es)     COMPREHENSIVE EYE EXAM     Laterality: both eyes    Onset: gradual    Onset: years ago    Course: gradually worsening    Associated symptoms: glare, haloes, dryness and floaters.  Negative for eye pain, tearing, photophobia and flashes    Treatments tried: artificial tears    Pain scale: 0/10    Comments: New Pt here for annual DM CEE.              Comments     Pt states vision has gotten worse in the last year.  Last eye exam was 4 years ago.  No change to floaters.  AT's PRN.    DM 2  No results found for: A1C  Pt does not know last A1C.  Pt does not check BS.    HAILEE Batista June 4, 2022 9:30 AM

## 2022-06-07 ENCOUNTER — APPOINTMENT (OUTPATIENT)
Dept: INTERPRETER SERVICES | Facility: CLINIC | Age: 62
End: 2022-06-07
Payer: COMMERCIAL

## 2022-06-09 ENCOUNTER — OFFICE VISIT (OUTPATIENT)
Dept: OPHTHALMOLOGY | Facility: CLINIC | Age: 62
End: 2022-06-09
Attending: OPHTHALMOLOGY
Payer: COMMERCIAL

## 2022-06-09 DIAGNOSIS — E11.9 CONTROLLED TYPE 2 DIABETES MELLITUS WITHOUT COMPLICATION, WITHOUT LONG-TERM CURRENT USE OF INSULIN (H): ICD-10-CM

## 2022-06-09 DIAGNOSIS — H53.453 PERIPHERAL VISION LOSS, BILATERAL: Primary | ICD-10-CM

## 2022-06-09 PROCEDURE — 92134 CPTRZ OPH DX IMG PST SGM RTA: CPT | Performed by: OPHTHALMOLOGY

## 2022-06-09 PROCEDURE — 92250 FUNDUS PHOTOGRAPHY W/I&R: CPT | Performed by: OPHTHALMOLOGY

## 2022-06-09 PROCEDURE — 92133 CPTRZD OPH DX IMG PST SGM ON: CPT | Performed by: OPHTHALMOLOGY

## 2022-06-09 PROCEDURE — 92134 CPTRZ OPH DX IMG PST SGM RTA: CPT | Mod: 26 | Performed by: STUDENT IN AN ORGANIZED HEALTH CARE EDUCATION/TRAINING PROGRAM

## 2022-06-09 PROCEDURE — 99207 FUNDUS PHOTOS OU (BOTH EYES): CPT | Mod: 26 | Performed by: STUDENT IN AN ORGANIZED HEALTH CARE EDUCATION/TRAINING PROGRAM

## 2022-06-09 PROCEDURE — G0463 HOSPITAL OUTPT CLINIC VISIT: HCPCS | Mod: 25

## 2022-06-09 PROCEDURE — 99207 OCT OPTIC NERVE RNFL SPECTRALIS OU (BOTH EYES): CPT | Mod: 26 | Performed by: STUDENT IN AN ORGANIZED HEALTH CARE EDUCATION/TRAINING PROGRAM

## 2022-06-09 PROCEDURE — 99213 OFFICE O/P EST LOW 20 MIN: CPT | Mod: GC | Performed by: STUDENT IN AN ORGANIZED HEALTH CARE EDUCATION/TRAINING PROGRAM

## 2022-06-09 ASSESSMENT — CONF VISUAL FIELD
METHOD: COUNTING FINGERS
OS_SUPERIOR_NASAL_RESTRICTION: 3
OD_INFERIOR_TEMPORAL_RESTRICTION: 3
OS_INFERIOR_TEMPORAL_RESTRICTION: 3
OD_SUPERIOR_NASAL_RESTRICTION: 3
OS_SUPERIOR_TEMPORAL_RESTRICTION: 3
OD_INFERIOR_NASAL_RESTRICTION: 3
OD_SUPERIOR_TEMPORAL_RESTRICTION: 3
OS_INFERIOR_NASAL_RESTRICTION: 3

## 2022-06-09 ASSESSMENT — VISUAL ACUITY
OD_PH_SC: 20/50
OS_PH_SC: 20/80
OS_SC: 20/125
METHOD: SNELLEN - LINEAR
OD_SC: 20/80

## 2022-06-09 ASSESSMENT — CUP TO DISC RATIO
OD_RATIO: 0.3
OS_RATIO: 0.3

## 2022-06-09 ASSESSMENT — TONOMETRY
OS_IOP_MMHG: 11
IOP_METHOD: TONOPEN
OD_IOP_MMHG: 14

## 2022-06-09 ASSESSMENT — EXTERNAL EXAM - LEFT EYE: OS_EXAM: NORMAL

## 2022-06-09 ASSESSMENT — EXTERNAL EXAM - RIGHT EYE: OD_EXAM: NORMAL

## 2022-06-09 NOTE — PROGRESS NOTES
I have confirmed the patient's and reviewed Past Medical History, Past Surgical History, Social History, Family History, Problem List, Medication List and agree with Tech note.    CC: new retina evaluation    HPI: Referred by Dr. Vo for evaluation of bilateral peripheral vision decrease describes a layer of smoke in front of both eyes left eye worse than right eye, difficulty reading and distance. Worsening vision started at time of diabetes diagnosis in 2020. Difficulty seeing at night time. Vision fluctuates, some days its blurry and some days its more clear but never goes back to normal.      Blood sugars well controlled, A1C below.    A1C%:  5.7% 2/15/2022   5.7% 9/7/2021  6.1% 5/19/2021  5.6% 1/29/2021  7% 7/10/2020      Medical history  Diabetes mellitus type II  Hypertension  Asthma  Beta-thalassemia trait    No history of ocular surgery     OCT macula 6/9/2022  RE: vitreo-macular adhesion, good foveal contour and retinal laminations  LE: good foveal contour and retinal laminations    FAF 6/9/22  peripapillary hypoautofluorescence both eyes     OCT RNFL 6/9/22  RE: nasal thinning   LE: Within normal limits     Assessment/plan:  1. Decreased vision in both eyes  Associated with nyctalopia, decreased color vision, peripapillary atrophy out of proportion to myopia  Trouble with hearing in the past year  No known family history of undiagnosed vision loss,   Recommend ffERG to evaluate cone rode function    2. Diabetes mellitus type II  Under good control, continue to manage under PCP supervision    3. Nuclear sclerosis, both eyes  Unlikely visually significant    4. Mild myopia and astigmatism both eyes  Refracts to 20/60 right eye, and 20/100 left eye with spherical equivalent of ~ -0.50 both eyes    RTC ffERG in 1-2 months and retina 2-3 months after test     Dipika Herndon MD  Ophthalmology Resident, PGY-3  [facility]       Fang Willson MD PhD.  Professor & Chair

## 2022-06-27 ENCOUNTER — ANCILLARY PROCEDURE (OUTPATIENT)
Dept: MRI IMAGING | Facility: CLINIC | Age: 62
End: 2022-06-27
Attending: FAMILY MEDICINE
Payer: COMMERCIAL

## 2022-06-27 DIAGNOSIS — M79.605 PAIN OF LEFT LOWER EXTREMITY: ICD-10-CM

## 2022-06-27 PROCEDURE — 73721 MRI JNT OF LWR EXTRE W/O DYE: CPT | Mod: LT | Performed by: RADIOLOGY

## 2022-07-04 ENCOUNTER — MEDICAL CORRESPONDENCE (OUTPATIENT)
Dept: HEALTH INFORMATION MANAGEMENT | Facility: CLINIC | Age: 62
End: 2022-07-04

## 2022-07-07 ENCOUNTER — TRANSCRIBE ORDERS (OUTPATIENT)
Dept: OTHER | Age: 62
End: 2022-07-07

## 2022-07-07 DIAGNOSIS — M79.605 PAIN OF LEFT LOWER EXTREMITY: Primary | ICD-10-CM

## 2022-07-19 ENCOUNTER — TELEPHONE (OUTPATIENT)
Dept: OPHTHALMOLOGY | Facility: CLINIC | Age: 62
End: 2022-07-19

## 2022-07-19 NOTE — TELEPHONE ENCOUNTER
Called and spoke to Maria T    Made an appointment for 8/16 @ 1 pm for Jayden and made another appointment with Dr. Silva for 10/ 27 @ 1015 AM. Pt Requested a later morning appointment.     A lot of background noise- I mailed an appointment verification to the address on file. Verified with [facility] we have the correct address     Diana Jones Communication Facilitator on 7/19/2022 at 11:17 AM

## 2022-07-27 DIAGNOSIS — M25.562 LEFT KNEE PAIN: Primary | ICD-10-CM

## 2022-07-28 NOTE — TELEPHONE ENCOUNTER
DIAGNOSIS: Left knee pain, possible meniscus tear, ACL tendinosis, Referred by Dr. Samson Lee ([facility])     APPOINTMENT DATE: 8/5/2022, 1 PM    NOTES STATUS DETAILS   OFFICE NOTE from other specialist Care Everywhere 5/24/2022 Office visit with Dr. Lucrecia Ferguson ([facility])    MEDICATION LIST Internal    LABS     CBC/DIFF Care Everywhere 4/13/2021   MRI Internal MR Knee Left: 6/27/2022   XRAYS (IMAGES & REPORTS) Internal XR Knee LT: 6/28/19

## 2022-08-05 ENCOUNTER — PRE VISIT (OUTPATIENT)
Dept: ORTHOPEDICS | Facility: CLINIC | Age: 62
End: 2022-08-05

## 2022-08-05 ENCOUNTER — OFFICE VISIT (OUTPATIENT)
Dept: ORTHOPEDICS | Facility: CLINIC | Age: 62
End: 2022-08-05
Payer: COMMERCIAL

## 2022-08-05 VITALS — BODY MASS INDEX: 24.1 KG/M2 | HEIGHT: 63 IN | WEIGHT: 136 LBS

## 2022-08-05 DIAGNOSIS — M17.10 ARTHRITIS OF KNEE: Primary | ICD-10-CM

## 2022-08-05 PROCEDURE — 20610 DRAIN/INJ JOINT/BURSA W/O US: CPT | Mod: LT | Performed by: ORTHOPAEDIC SURGERY

## 2022-08-05 PROCEDURE — 99202 OFFICE O/P NEW SF 15 MIN: CPT | Mod: 25 | Performed by: ORTHOPAEDIC SURGERY

## 2022-08-05 RX ADMIN — BUPIVACAINE HYDROCHLORIDE 3 ML: 2.5 INJECTION, SOLUTION INFILTRATION; PERINEURAL at 14:44

## 2022-08-05 RX ADMIN — TRIAMCINOLONE ACETONIDE 40 MG: 40 INJECTION, SUSPENSION INTRA-ARTICULAR; INTRAMUSCULAR at 14:44

## 2022-08-05 RX ADMIN — LIDOCAINE HYDROCHLORIDE 3 ML: 10 INJECTION, SOLUTION EPIDURAL; INFILTRATION; INTRACAUDAL; PERINEURAL at 14:44

## 2022-08-05 NOTE — LETTER
Date:August 12, 2022      Patient was self referred, no letter generated. Do not send.        [facility] Health Information

## 2022-08-05 NOTE — NURSING NOTE
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

## 2022-08-05 NOTE — PROGRESS NOTES
Large Joint Injection/Arthocentesis: L knee joint    Date/Time: 2022 2:44 PM  Performed by: Ravi Freedman MD  Authorized by: Ravi Freedman MD     Indications:  Pain  Needle Size:  22 G  Guidance: landmark guided    Location:  Knee      Medications:  40 mg triamcinolone 40 MG/ML; 3 mL bupivacaine 0.25 %; 3 mL lidocaine (PF) 1 %  Outcome:  Tolerated well, no immediate complications  Procedure discussed: discussed risks, benefits, and alternatives    Consent Given by:  Patient  Timeout: timeout called immediately prior to procedure     Left Knee injection using 3 ml lidocaine, 3 ml marcaine and 1 ml kenalog 40      [facility] [address] [id number] [phone]  Dept: [phone]  ______________________________________________________________________________    Patient: Maria T Pineda   : 1960   MRN: [id number]   2022    INVASIVE PROCEDURE SAFETY CHECKLIST    Date: 2022   Procedure: Left knee injection   Patient Name: Maria T Pineda  MRN: [id number]  YOB: 1960    Action: Complete sections as appropriate. Any discrepancy results in a HARD COPY until resolved.     PRE PROCEDURE:  Patient ID verified with 2 identifiers (name and  or MRN): Yes  Procedure and site verified with patient/designee (when able): Yes  Accurate consent documentation in medical record: Yes  H&P (or appropriate assessment) documented in medical record: NA  H&P must be up to 20 days prior to procedure and updates within 24 hours of procedure as applicable: NA  Relevant diagnostic and radiology test results appropriately labeled and displayed as applicable: NA  Procedure site(s) marked with provider initials: NA    TIMEOUT:  Time-Out performed immediately prior to starting procedure, including verbal and active participation of all team members addressing the following:NA  * Correct patient identify  * Confirmed that the correct  side and site are marked  * An accurate procedure consent form  * Agreement on the procedure to be done  * Correct patient position  * Relevant images and results are properly labeled and appropriately displayed  * The need to administer antibiotics or fluids for irrigation purposes during the procedure as applicable   * Safety precautions based on patient history or medication use    DURING PROCEDURE: Verification of correct person, site, and procedures any time the responsibility for care of the patient is transferred to another member of the care team.       The following medications were given:         Prior to injection, verified patient identity using patient's name and date of birth.  Due to injection administration, patient instructed to remain in clinic for 15 minutes  afterwards, and to report any adverse reaction to me immediately.    Joint injection was performed.    Medication Name: Marcaine NDC 9344-6869-01  Drug Amount Wasted:  Yes: 47 mg/ml   Vial/Syringe: Multi dose vial  Expiration Date:  2/1/24    Medication Name Lidocaine NDC 66550-993-20    Medication Name Kenalog NDC [address]-4    Scribed by Donald Hills ATC for Dr. Freedman on August 5, 2022 at 2:30 pm based on the provider's statements to me.     Donald Hills ATC    I was present entire procedure, I performed injection.    Ravi Freedman MD

## 2022-08-05 NOTE — LETTER
2022         RE: Maria T Pineda  [address]  [facility] [address]        Dear Colleague,    Thank you for referring your patient, Maria T Pineda, to the [facility]. Please see a copy of my visit note below.    Large Joint Injection/Arthocentesis: L knee joint    Date/Time: 2022 2:44 PM  Performed by: Ravi Freedman MD  Authorized by: Ravi Freedman MD     Indications:  Pain  Needle Size:  22 G  Guidance: landmark guided    Location:  Knee      Medications:  40 mg triamcinolone 40 MG/ML; 3 mL bupivacaine 0.25 %; 3 mL lidocaine (PF) 1 %  Outcome:  Tolerated well, no immediate complications  Procedure discussed: discussed risks, benefits, and alternatives    Consent Given by:  Patient  Timeout: timeout called immediately prior to procedure     Left Knee injection using 3 ml lidocaine, 3 ml marcaine and 1 ml kenalog 40      [address] [id number] [phone]  Dept: [phone]  ______________________________________________________________________________    Patient: Maria T Pineda   : 1960   MRN: [id number]   2022    INVASIVE PROCEDURE SAFETY CHECKLIST    Date: 2022   Procedure: Left knee injection   Patient Name: Maria T Pineda  MRN: [id number]  YOB: 1960    Action: Complete sections as appropriate. Any discrepancy results in a HARD COPY until resolved.     PRE PROCEDURE:  Patient ID verified with 2 identifiers (name and  or MRN): Yes  Procedure and site verified with patient/designee (when able): Yes  Accurate consent documentation in medical record: Yes  H&P (or appropriate assessment) documented in medical record: NA  H&P must be up to 20 days prior to procedure and updates within 24 hours of procedure as applicable: NA  Relevant diagnostic and radiology test results appropriately labeled and displayed as applicable:  "NA  Procedure site(s) marked with provider initials: NA    TIMEOUT:  Time-Out performed immediately prior to starting procedure, including verbal and active participation of all team members addressing the following:NA  * Correct patient identify  * Confirmed that the correct side and site are marked  * An accurate procedure consent form  * Agreement on the procedure to be done  * Correct patient position  * Relevant images and results are properly labeled and appropriately displayed  * The need to administer antibiotics or fluids for irrigation purposes during the procedure as applicable   * Safety precautions based on patient history or medication use    DURING PROCEDURE: Verification of correct person, site, and procedures any time the responsibility for care of the patient is transferred to another member of the care team.       The following medications were given:         Prior to injection, verified patient identity using patient's name and date of birth.  Due to injection administration, patient instructed to remain in clinic for 15 minutes  afterwards, and to report any adverse reaction to me immediately.    Joint injection was performed.    Medication Name: Marcaine NDC 3982-6201-19  Drug Amount Wasted:  Yes: 47 mg/ml   Vial/Syringe: Multi dose vial  Expiration Date:  2/1/24    Medication Name Lidocaine NDC 26197-531-21    Medication Name Kenalog NDC [address]-3    Scribed by Donald Hills ATC for Dr. Freedman on August 5, 2022 at 2:30 pm based on the provider's statements to me.     Donald Hills ATC    I was present entire procedure, I performed injection.    Ravi Freedman MD    S:  Patient presents with left knee pain.  We spoke through .  No inciting event.  Bothers her at work.       There is no problem list on file for this patient.           Past Medical History:   Diagnosis Date     Asthma     Pt describes having asthma and taking \"nasal spray\" daily for relief      Chronic knee pain     " bilateral, takes ibuprofen for the pain      Diabetes (H)      History of kidney problems     diagnosed and treated herbally in [place] s/p resolved per pt             Past Surgical History:   Procedure Laterality Date     hemorrhoid surgery       Pt describes a procedure performed in [place] to remove hemorrhoids. Possibly a colonoscopy?            Social History     Tobacco Use     Smoking status: Never Smoker     Smokeless tobacco: Never Used   Substance Use Topics     Alcohol use: Never            Family History   Problem Relation Age of Onset     Lung Cancer Father      Ovarian Cancer Paternal Aunt      Glaucoma No family hx of      Macular Degeneration No family hx of              No Known Allergies         Current Outpatient Medications   Medication Sig Dispense Refill     DULERA 100-5 MCG/ACT inhaler Inhale 2 puffs by mouth 2 times per day  3     folic acid (FOLVITE) 1 MG tablet Take 1,000 mcg by mouth daily  3     losartan (COZAAR) 50 MG tablet   3     omeprazole (PRILOSEC) 20 MG DR capsule take 1 capsule (20 mg) by oral route once daily before a meal  1     Pyridoxine HCl (VITAMIN B6) 50 MG TABS   3     ranitidine (ZANTAC) 150 MG tablet TAKE 1 TABLET BY MOUTH TWICE DAILY AS NEEDED  3          Review Of Systems  Skin: negative  Eyes: negative  Ears/Nose/Throat: negative  Respiratory: No shortness of breath, dyspnea on exertion, cough, or hemoptysis    O: Physical Exam:  Effusion L knee.  Pain to palpation parapatellar, medial and lateral joint line pain to palpation.  CMS intact to LLE.  Adequate knee flexion and extension.  No evidence instability.    Labs:update inflammatory labs/arthritic profile    Images:  Findings:     AP, lateral and patellofemoral views of the left knee were obtained.      No acute osseous abnormality.  No substantial joint effusion.     No substantial change in tricompartmental degenerative change,  greatest in the medial and patellofemoral compartments where there is  moderate  joint space narrowing.     No patellar tilt or lateral subluxation.  Soft tissue is unremarkable.                                                                      Impression:  1. No acute osseous abnormality.  2. Stable moderate tricompartmental degenerative change, greatest in  the medial and patellofemoral compartments.       Findings:     MENISCI:  Medial meniscus: Multidirectional tearing of the posterior horn of the  medial meniscus. Horizontal tearing of the body.  Lateral meniscus: Intact.     LIGAMENTS  Cruciate ligaments: Thickening and increased signal of the ACL  compatible with cystic degeneration. Posterior cruciate ligament  intact.  Medial supporting structures: Intact. Mild fluid in the distal pes  anserine bursa.  Lateral supporting structures: Intact.     EXTENSOR MECHANISM  Intact.     FLUID  No joint effusion. No substantial Hewitt's cyst. Multilobular ganglion  cyst posterior to the proximal tibia with internal debris and joint  bodies measuring up to 3.8 cm craniocaudal.     OSSEOUS and ARTICULAR STRUCTURES  Bones: No fracture, contusion, or osseous lesion is seen.     Patellofemoral compartment: Full-thickness cartilage loss over the  median ridge and medial facet of the patella with subchondral cystic  change. Full-thickness cartilage loss over the lateral facet of the  trochlea with subchondral cysts.     Medial compartment: Large areas of full-thickness cartilage loss of  the weightbearing medial femoral condyle and medial tibial plateau  with subchondral edema and subchondral cyst.     Lateral compartment: Full-thickness cartilage fissuring over the  anterior weightbearing medial femoral condyle with subchondral cystic  change.     ANCILLARY FINDINGS  None.                                                                      Impression:     1. Multidirectional tearing of the posterior horn of the medial  meniscus.     2. 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         A:  Left knee arthropathy    P:  Inject Left knee joint after verbal and written consent, chloroprep and ethyl chloride.  Follow outcomes  Notify if there are exacerbation of symptoms.  See back 6-12 weeks    Update labs         In addition to the above assessment and plan each active problem on [facility]'s problem list was evaluated today. This included the questioning of Thi for any medication problems. We will continue the current treatment plan for these active problems except as noted        Again, thank you for allowing me to participate in the care of your patient.        Sincerely,        RENNY ALMEIDA MD

## 2022-08-09 DIAGNOSIS — H35.89 OTHER SPECIFIED RETINAL DISORDERS: Primary | ICD-10-CM

## 2022-08-11 RX ORDER — TRIAMCINOLONE ACETONIDE 40 MG/ML
40 INJECTION, SUSPENSION INTRA-ARTICULAR; INTRAMUSCULAR
Status: SHIPPED | OUTPATIENT
Start: 2022-08-05

## 2022-08-11 RX ORDER — LIDOCAINE HYDROCHLORIDE 10 MG/ML
3 INJECTION, SOLUTION EPIDURAL; INFILTRATION; INTRACAUDAL; PERINEURAL
Status: SHIPPED | OUTPATIENT
Start: 2022-08-05

## 2022-08-11 RX ORDER — BUPIVACAINE HYDROCHLORIDE 2.5 MG/ML
3 INJECTION, SOLUTION INFILTRATION; PERINEURAL
Status: SHIPPED | OUTPATIENT
Start: 2022-08-05

## 2022-08-12 NOTE — PROGRESS NOTES
"S:  Patient presents with left knee pain.  We spoke through .  No inciting event.  Bothers her at work.       There is no problem list on file for this patient.           Past Medical History:   Diagnosis Date     Asthma     Pt describes having asthma and taking \"nasal spray\" daily for relief      Chronic knee pain     bilateral, takes ibuprofen for the pain      Diabetes (H)      History of kidney problems     diagnosed and treated herbally in [place] s/p resolved per pt             Past Surgical History:   Procedure Laterality Date     hemorrhoid surgery       Pt describes a procedure performed in [place] to remove hemorrhoids. Possibly a colonoscopy?            Social History     Tobacco Use     Smoking status: Never Smoker     Smokeless tobacco: Never Used   Substance Use Topics     Alcohol use: Never            Family History   Problem Relation Age of Onset     Lung Cancer Father      Ovarian Cancer Paternal Aunt      Glaucoma No family hx of      Macular Degeneration No family hx of              No Known Allergies         Current Outpatient Medications   Medication Sig Dispense Refill     DULERA 100-5 MCG/ACT inhaler Inhale 2 puffs by mouth 2 times per day  3     folic acid (FOLVITE) 1 MG tablet Take 1,000 mcg by mouth daily  3     losartan (COZAAR) 50 MG tablet   3     omeprazole (PRILOSEC) 20 MG DR capsule take 1 capsule (20 mg) by oral route once daily before a meal  1     Pyridoxine HCl (VITAMIN B6) 50 MG TABS   3     ranitidine (ZANTAC) 150 MG tablet TAKE 1 TABLET BY MOUTH TWICE DAILY AS NEEDED  3          Review Of Systems  Skin: negative  Eyes: negative  Ears/Nose/Throat: negative  Respiratory: No shortness of breath, dyspnea on exertion, cough, or hemoptysis    O: Physical Exam:  Effusion L knee.  Pain to palpation parapatellar, medial and lateral joint line pain to palpation.  CMS intact to LLE.  Adequate knee flexion and extension.  No evidence instability.    Labs:update inflammatory " labs/arthritic profile    Images:  Findings:     AP, lateral and patellofemoral views of the left knee were obtained.      No acute osseous abnormality.  No substantial joint effusion.     No substantial change in tricompartmental degenerative change,  greatest in the medial and patellofemoral compartments where there is  moderate joint space narrowing.     No patellar tilt or lateral subluxation.  Soft tissue is unremarkable.                                                                      Impression:  1. No acute osseous abnormality.  2. Stable moderate tricompartmental degenerative change, greatest in  the medial and patellofemoral compartments.       Findings:     MENISCI:  Medial meniscus: Multidirectional tearing of the posterior horn of the  medial meniscus. Horizontal tearing of the body.  Lateral meniscus: Intact.     LIGAMENTS  Cruciate ligaments: Thickening and increased signal of the ACL  compatible with cystic degeneration. Posterior cruciate ligament  intact.  Medial supporting structures: Intact. Mild fluid in the distal pes  anserine bursa.  Lateral supporting structures: Intact.     EXTENSOR MECHANISM  Intact.     FLUID  No joint effusion. No substantial Hewitt's cyst. Multilobular ganglion  cyst posterior to the proximal tibia with internal debris and joint  bodies measuring up to 3.8 cm craniocaudal.     OSSEOUS and ARTICULAR STRUCTURES  Bones: No fracture, contusion, or osseous lesion is seen.     Patellofemoral compartment: Full-thickness cartilage loss over the  median ridge and medial facet of the patella with subchondral cystic  change. Full-thickness cartilage loss over the lateral facet of the  trochlea with subchondral cysts.     Medial compartment: Large areas of full-thickness cartilage loss of  the weightbearing medial femoral condyle and medial tibial plateau  with subchondral edema and subchondral cyst.     Lateral compartment: Full-thickness cartilage fissuring over the  anterior  weightbearing medial femoral condyle with subchondral cystic  change.     ANCILLARY FINDINGS  None.                                                                      Impression:     1. Multidirectional tearing of the posterior horn of the medial  meniscus.     2. Tricompartmental grade IV chondromalacia most severe in the medial  compartment.         A:  Left knee arthropathy    P:  Inject Left knee joint after verbal and written consent, chloroprep and ethyl chloride.  Follow outcomes  Notify if there are exacerbation of symptoms.  See back 6-12 weeks    Update labs         In addition to the above assessment and plan each active problem on [facility]'s problem list was evaluated today. This included the questioning of [facility] for any medication problems. We will continue the current treatment plan for these active problems except as noted

## 2022-08-16 ENCOUNTER — ALLIED HEALTH/NURSE VISIT (OUTPATIENT)
Dept: OPHTHALMOLOGY | Facility: CLINIC | Age: 62
End: 2022-08-16
Payer: COMMERCIAL

## 2022-08-16 DIAGNOSIS — H53.453 PERIPHERAL VISION LOSS, BILATERAL: ICD-10-CM

## 2022-08-16 DIAGNOSIS — H35.89 OTHER SPECIFIED RETINAL DISORDERS: ICD-10-CM

## 2022-08-16 PROCEDURE — 999N000103 HC STATISTIC NO CHARGE FACILITY FEE

## 2022-08-16 PROCEDURE — 92273 FULL FIELD ERG W/I&R: CPT

## 2022-08-16 ASSESSMENT — VISUAL ACUITY
OS_SC: 20/125
METHOD: SNELLEN - LINEAR
OS_PH_SC: 20/100
OD_SC: 20/60
OD_SC+: -

## 2022-08-16 NOTE — PROGRESS NOTES
STANDARD ffERG REPORT    ffERG Date:  2022    Referring:   Dr. Willson     RE:  Maria T Pineda  MRN:  [id number]  :  1960    CLINICAL HISTORY: Referred by Dr. Vo to Dr. Willson for decreased vision, peripheral vision loss both eyes. Last eye exam w/Dr. Vo 22:  Pt notes she has always needed glasses, but with correction has had good vision in the past. No recent episodes of vision loss. She just assumed her vision loss was from diabetes. Exam fairly unremarkable - no bone spicules, no attenuation of vessels. Last eye exam with Dr. Willson 22:  Decreased vision in both eyes. Associated with nyctalopia, decreased color vision, peripapillary atrophy out of proportion to myopia. Trouble with hearing in the past year. No known family history of undiagnosed vision loss. Recommend ffERG to evaluate cone melia function. +DMII, +Beta-thalassemia trait.  +Sami  over the phone.               Visual Acuity Right Eye : 20/60-, PHNI      W/o gls    Visual Acuity Left Eye : 20/125, PH 20/100      W/o gls    IMPRESSION ffERG:                                                          ALL AVERAGED                   Data for Full-Field ERG  Right Eye  Left Eye   DARK-ADAPTED Patient Normal Patient   0.01 ERG (melia) b-wave amplitude ( v) 203 69.6 to 348.2 213   0.01 ERG (melia) b-wave implicit time (ms) 96.5 78.2 to 117.2 99.8         3.0 ERG (combined) a-wave amplitude ( v) -118 -259.4 to -98 -132   3.0 ERG (combined) a-wave implicit time (ms) 23.3 11.5 to 20.3 23.9   3.0 ERG (combined) b-wave amplitude ( v) 281 159.2 to 421.6 298   3.0 ERG (combined) b-wave implicit time (ms) 53.3 41.2 to 57.2 54.4         10.0 ERG(brighter combined) a-wave amplitude ( v) -150 -291 to -110 -158   10.0 ERG(brighter combined) a-wave implicit time (ms) 14.4 9.9 to 15.1 15   10.0 ERG(brighter combined) b-wave amplitude ( v) 281 191.5 to 403.1 297   10.0 ERG(brighter combined) b-wave implicit time (ms)  52.1 39.1 to 55.3 52.1         3.0 Oscillatory Potentials  present    LIGHT-ADAPTED       3.0 Flicker (30Hz) amplitude ( v) 68 56.4 to 151.2 79   3.0 Flicker (30Hz) implicit time (ms) 28.3 21.8 to 31.6 30         3.0 ERG (cone) a-wave amplitude ( v) -16 -45.1 to -13.7 -20   3.0 ERG (cone) a-wave implicit time (ms) 15.5 11.4 to 16.8 16.1   3.0 ERG (cone) b-wave amplitude ( v) 75 62.4 to 174.2 85   3.0 ERG (cone) b-wave implicit time (ms) 32.2 26.8 to 34.2 32.2   * = manipulated cursors  parentheses = cursors at selected peaks  ---- = residual to non-measurable  xxxx = not tested      Tech notes:  ffERG discussed and performed w/E3 system.  +Scottish  over the phone.  Equally well-dilated ~8.5mm.  Tolerated dtl nicely.  Equal and adequate eye opening w/easy effort to clear dilated pupils. Impedances low and comparable throughout.  No difficulty w/blinking.  Specifically, no eyelid flutter to bright flashes and only slight flutter with flicker.      INTERPRETATION:     The electroretinogram was performed according to ISCEV standards using ESPION E3 system and DTL fiber recording electrodes. The patient tolerated the testing well. The waveforms are fairly reproducible and well formed. The responses in between both eyes were similar.  The normative values provided above represent the 95% confidence limits for a normal individual the age of the patient. The patient s responses are averaged.  In scotopic conditions, the melia specific responses were normal in both eyes.  The maximal response, a combined melia and cone responses were essentially normal and the implicit time was normal in both eyes. The implicit time was delayed for the a-wave combined responses in both eyes.  The brighter combined responses were not electronegative in both eyes   In light adapted conditions, the 30-Hz flicker response has a normal amplitude and normal implicit time in both eyes. The single photopic flash response are  normal.    Conclusion:   This represents a likely normal electroretinogram. There is no evidence of significant melia or cone photoreceptor abnormalities in either eye. There is delayed on the implicit time in the a-wave dark adapated responses of unclear significance.  Clinical correlation is recommended.       I would recommend a repeated electroretinogram in a couple of years if there continues to be concern regarding a possible retinal dystrophy.     Dear Kofi, thank you for the opportunity to provide electrophysiologic services for this patient.  Please do not hesitate to call if there should be any questions regarding these results.    Rolanda Caraballo MD  Professor of ophthalmology  Retina Service   Department of Ophthalmology and Visual Neurosciences   [facility]  Phone: [phone]   Fax: [phone]

## 2022-08-16 NOTE — LETTER
STANDARD ffERG REPORT    ffERG Date:  2022    Referring:   Kofi Willson MD, PhD     RE:  Maria T Pineda  MRN:  [id number]  :  1960    CLINICAL HISTORY: Referred by Dr. Vo to Dr. Willson for decreased vision, peripheral vision loss both eyes. Last eye exam w/Dr. Vo 22:  Pt notes she has always needed glasses, but with correction has had good vision in the past. No recent episodes of vision loss. She just assumed her vision loss was from diabetes. Exam fairly unremarkable - no bone spicules, no attenuation of vessels. Last eye exam with Dr. Willson 22:  Decreased vision in both eyes. Associated with nyctalopia, decreased color vision, peripapillary atrophy out of proportion to myopia. Trouble with hearing in the past year. No known family history of undiagnosed vision loss. Recommend ffERG to evaluate cone melia function. +DMII, +Beta-thalassemia trait.  +Hungarian  over the phone.               Visual Acuity without glasses Right Eye: 20/60-, PHNI      Left Eye: 20/125, PH 20/100                                              ALL AVERAGED                   Data for Full-Field ERG  Right Eye  Left Eye   DARK-ADAPTED Patient Normal Patient   0.01 ERG (melia) b-wave amplitude ( v) 203 69.6 to 348.2 213   0.01 ERG (melia) b-wave implicit time (ms) 96.5 78.2 to 117.2 99.8         3.0 ERG (combined) a-wave amplitude ( v) -118 -259.4 to -98 -132   3.0 ERG (combined) a-wave implicit time (ms) 23.3 11.5 to 20.3 23.9   3.0 ERG (combined) b-wave amplitude ( v) 281 159.2 to 421.6 298   3.0 ERG (combined) b-wave implicit time (ms) 53.3 41.2 to 57.2 54.4         10.0 ERG(brighter combined) a-wave amplitude ( v) -150 -291 to -110 -158   10.0 ERG(brighter combined) a-wave implicit time (ms) 14.4 9.9 to 15.1 15   10.0 ERG(brighter combined) b-wave amplitude ( v) 281 191.5 to 403.1 297   10.0 ERG(brighter combined) b-wave implicit time (ms) 52.1 39.1 to 55.3 52.1         3.0 Oscillatory  Potentials  Present     LIGHT-ADAPTED       3.0 Flicker (30Hz) amplitude ( v) 68 56.4 to 151.2 79   3.0 Flicker (30Hz) implicit time (ms) 28.3 21.8 to 31.6 30         3.0 ERG (cone) a-wave amplitude ( v) -16 -45.1 to -13.7 -20   3.0 ERG (cone) a-wave implicit time (ms) 15.5 11.4 to 16.8 16.1   3.0 ERG (cone) b-wave amplitude ( v) 75 62.4 to 174.2 85   3.0 ERG (cone) b-wave implicit time (ms) 32.2 26.8 to 34.2 32.2   * = manipulated cursors  parentheses = cursors at selected peaks  ---- = residual to non-measurable  xxxx = not tested      Tech notes:  ffERG discussed and performed w/E3 system.  +Tanzanian  over the phone.  Equally well-dilated ~8.5mm.  Tolerated dtl nicely.  Equal and adequate eye opening w/easy effort to clear dilated pupils. Impedances low and comparable throughout.  No difficulty w/blinking.  Specifically, no eyelid flutter to bright flashes and only slight flutter with flicker.      INTERPRETATION:   This electroretinogram was performed according to ISCEV standards using the ESPION E3 system and DTL fiber-recording electrodes. The patient tolerated the testing well. The waveforms are fairly reproducible and well formed. The responses in between both eyes were similar. The normative values provided above represent the 95% confidence limits for a normal individual the age of the patient. The patient s responses are averaged.    In scotopic conditions, the melia-specific responses were normal in both eyes. The maximal response, a combined melia and cone response, was essentially normal and the implicit time was normal in both eyes. The implicit time was delayed for the a-wave combined responses in both eyes. The brighter combined responses were not electronegative in both eyes.     In light-adapted conditions, the 30-Hz flicker response has a normal amplitude and normal implicit time in both eyes. The single photopic flash responses are normal.    CONCLUSION:  This represents a likely normal  electroretinogram. There is no evidence of significant melia or cone photoreceptor abnormalities in either eye. There is delay in the implicit time in the a-wave dark-adapated responses of unclear significance. Clinical correlation is recommended.       I would recommend a repeat electroretinogram in a couple of years if there continues to be concern regarding a possible retinal dystrophy.     Dear Kofi, thank you for the opportunity to provide electrophysiologic services for this patient.  Please do not hesitate to call if there should be any questions regarding these results.    Rolanda Caraballo MD  Professor of ophthalmology  Retina Service   Department of Ophthalmology and Visual Neurosciences   [facility]  Phone: [phone]   Fax: [phone]      cc:  Ravi Vo MD

## 2022-08-16 NOTE — NURSING NOTE
Referred by Dr. Vo to Dr. Willson for decreased vision, peripheral vision loss both eyes. Last eye exam w/Dr. Vo 06-04-22:  Pt notes she has always needed glasses, but with correction has had good vision in the past. No recent episodes of vision loss. She just assumed her vision loss was from diabetes. Exam fairly unremarkable - no bone spicules, no attenuation of vessels. Last eye exam w/Dr. Willson 06-09-22:  Decreased vision in both eyes. Associated with nyctalopia, decreased color vision, peripapillary atrophy out of proportion to myopia. Trouble with hearing in the past year. No known family history of undiagnosed vision loss. Recommend ffERG to evaluate cone melia function. +DMII, +Beta-thalassemia trait.  +Bengali  over the phone.

## 2022-08-30 ENCOUNTER — MEDICAL CORRESPONDENCE (OUTPATIENT)
Dept: HEALTH INFORMATION MANAGEMENT | Facility: CLINIC | Age: 62
End: 2022-08-30

## 2022-09-07 ENCOUNTER — TRANSCRIBE ORDERS (OUTPATIENT)
Dept: OTHER | Age: 62
End: 2022-09-07

## 2022-09-07 DIAGNOSIS — H53.8 BLURRY VISION: Primary | ICD-10-CM

## 2022-10-04 ENCOUNTER — LAB REQUISITION (OUTPATIENT)
Dept: LAB | Facility: CLINIC | Age: 62
End: 2022-10-04
Payer: COMMERCIAL

## 2022-10-04 DIAGNOSIS — E11.9 TYPE 2 DIABETES MELLITUS WITHOUT COMPLICATIONS (H): ICD-10-CM

## 2022-10-04 LAB
ALBUMIN SERPL BCG-MCNC: 4.7 G/DL (ref 3.5–5.2)
ALP SERPL-CCNC: 71 U/L (ref 35–104)
ALT SERPL W P-5'-P-CCNC: 30 U/L (ref 10–35)
ANION GAP SERPL CALCULATED.3IONS-SCNC: 12 MMOL/L (ref 7–15)
AST SERPL W P-5'-P-CCNC: 21 U/L (ref 10–35)
BILIRUB SERPL-MCNC: 0.5 MG/DL
BUN SERPL-MCNC: 15.2 MG/DL (ref 8–23)
CALCIUM SERPL-MCNC: 9.3 MG/DL (ref 8.8–10.2)
CHLORIDE SERPL-SCNC: 104 MMOL/L (ref 98–107)
CREAT SERPL-MCNC: 0.52 MG/DL (ref 0.51–0.95)
CREAT UR-MCNC: 12.7 MG/DL
DEPRECATED HCO3 PLAS-SCNC: 23 MMOL/L (ref 22–29)
GFR SERPL CREATININE-BSD FRML MDRD: >90 ML/MIN/1.73M2
GLUCOSE SERPL-MCNC: 151 MG/DL (ref 70–99)
MICROALBUMIN UR-MCNC: <12 MG/L
MICROALBUMIN/CREAT UR: NORMAL MG/G{CREAT}
POTASSIUM SERPL-SCNC: 3.9 MMOL/L (ref 3.4–5.3)
PROT SERPL-MCNC: 7.6 G/DL (ref 6.4–8.3)
SODIUM SERPL-SCNC: 139 MMOL/L (ref 136–145)

## 2022-10-04 PROCEDURE — 80053 COMPREHEN METABOLIC PANEL: CPT | Performed by: PEDIATRICS

## 2022-10-04 PROCEDURE — 82570 ASSAY OF URINE CREATININE: CPT | Mod: ORL | Performed by: PEDIATRICS

## 2022-10-11 ENCOUNTER — ANCILLARY PROCEDURE (OUTPATIENT)
Dept: MAMMOGRAPHY | Facility: CLINIC | Age: 62
End: 2022-10-11
Attending: FAMILY MEDICINE
Payer: COMMERCIAL

## 2022-10-11 DIAGNOSIS — Z12.31 VISIT FOR SCREENING MAMMOGRAM: ICD-10-CM

## 2022-10-11 PROCEDURE — 77067 SCR MAMMO BI INCL CAD: CPT | Mod: 26

## 2022-10-11 PROCEDURE — 77067 SCR MAMMO BI INCL CAD: CPT

## 2022-10-31 ENCOUNTER — TELEPHONE (OUTPATIENT)
Dept: OPHTHALMOLOGY | Facility: CLINIC | Age: 62
End: 2022-10-31

## 2022-10-31 NOTE — TELEPHONE ENCOUNTER
[facility] Call Center    Phone Message    May a detailed message be left on voicemail: yes     Reason for Call: Other: Pt missed her 2 month f/u Appt with Dr. Silva on 10/27 due to transportation issues. Her  Van called to get pt scheduled, but next available was 12/1 which pt is scheduled for, but falls outside of the 2 month f/u timeframe. Please review to see if pt needs to be r/s sooner and reach out to Van to r/s if needed. Thank you.     Action Taken: Message routed to:  [facility] EYE    Travel Screening: Not Applicable

## 2022-10-31 NOTE — TELEPHONE ENCOUNTER
Confirmed appointment in Dec is ok with Dr. CHUY Jones Communication Facilitator on 10/31/2022 at 2:41 PM

## 2022-12-01 ENCOUNTER — TELEPHONE (OUTPATIENT)
Dept: OPHTHALMOLOGY | Facility: CLINIC | Age: 62
End: 2022-12-01

## 2022-12-01 ENCOUNTER — OFFICE VISIT (OUTPATIENT)
Dept: OPHTHALMOLOGY | Facility: CLINIC | Age: 62
End: 2022-12-01
Attending: OPHTHALMOLOGY
Payer: COMMERCIAL

## 2022-12-01 DIAGNOSIS — E11.9 TYPE 2 DIABETES MELLITUS WITHOUT COMPLICATION, WITHOUT LONG-TERM CURRENT USE OF INSULIN (H): Primary | ICD-10-CM

## 2022-12-01 DIAGNOSIS — H53.8 BLURRY VISION: ICD-10-CM

## 2022-12-01 PROCEDURE — 92250 FUNDUS PHOTOGRAPHY W/I&R: CPT | Performed by: OPHTHALMOLOGY

## 2022-12-01 PROCEDURE — G0463 HOSPITAL OUTPT CLINIC VISIT: HCPCS

## 2022-12-01 PROCEDURE — 99207 MULTICOLOR IMAGE OU (BOTH EYES): CPT | Mod: 26 | Performed by: OPHTHALMOLOGY

## 2022-12-01 PROCEDURE — 92134 CPTRZ OPH DX IMG PST SGM RTA: CPT | Performed by: OPHTHALMOLOGY

## 2022-12-01 PROCEDURE — 99214 OFFICE O/P EST MOD 30 MIN: CPT | Performed by: OPHTHALMOLOGY

## 2022-12-01 ASSESSMENT — VISUAL ACUITY
OS_PH_SC+: -2
OD_PH_SC+: -2
OD_SC+: -2
OS_SC: 20/100
METHOD: SNELLEN - LINEAR
OD_SC: 20/50
OS_SC+: -1
OD_PH_SC: 20/40
OS_PH_SC: 20/50

## 2022-12-01 ASSESSMENT — EXTERNAL EXAM - RIGHT EYE: OD_EXAM: NORMAL

## 2022-12-01 ASSESSMENT — TONOMETRY
IOP_METHOD: ICARE
OD_IOP_MMHG: 14
OS_IOP_MMHG: 10

## 2022-12-01 ASSESSMENT — CONF VISUAL FIELD
OD_INFERIOR_NASAL_RESTRICTION: 3
METHOD: COUNTING FINGERS
OD_INFERIOR_TEMPORAL_RESTRICTION: 0
OS_INFERIOR_TEMPORAL_RESTRICTION: 0
OS_SUPERIOR_TEMPORAL_RESTRICTION: 3
OS_INFERIOR_NASAL_RESTRICTION: 3
OD_SUPERIOR_NASAL_RESTRICTION: 3
OS_SUPERIOR_NASAL_RESTRICTION: 3
OD_SUPERIOR_TEMPORAL_RESTRICTION: 0

## 2022-12-01 ASSESSMENT — CUP TO DISC RATIO
OS_RATIO: 0.3
OD_RATIO: 0.3

## 2022-12-01 ASSESSMENT — EXTERNAL EXAM - LEFT EYE: OS_EXAM: NORMAL

## 2022-12-01 NOTE — TELEPHONE ENCOUNTER
12-01-22 2:30  Called pt via [employer]  and LVM.  No details given except to inform pt that we would like testing and return visit w/Dr. Silva for Thur Jan 12, 2023 at 8:30.  Per Prog Notes from visit today, follow-up to be in 6 weeks and Jan 12 was earliest available for TT, BD and EVK.  My direct number# given for reply ([phone]).  Will discuss testing in more detail when pt calls back to confirm or I will try to contact patient again.      --EVK to interpret both pERG and mfERG.    12-02-22  11:45  Called and spoke w/[employer] .  Pt unable to come in at 8:30 b/c of transportation issues (needs 2 bus transfers) and says that prior appts that started at 9:30 worked better.  Agreed to start at 9:30 for appts on Jan 12.  Testing to be done first (pERG and mfERG) then Dr. Silva appt to follow.    Routed to [place] to demetrius/Fely avila:    Thur Jan 12 @9:30  Eye Electrodiagnostic  Appt notes = TECH CKIN FIRST IN MD ENCOUNTER AT 9:30 AND NO DILATION then pERG first (TT to do) and mfERG second (BD to do), (EVK both)  Duration = 3 hours  Referring MD = EVK  Message = chart at  for BD/TT (2 appts)    Thur Jan 12 @ around 1:00 but not earlier  Eye  Retina  Appt notes = 6-wk return, TECH CKIN FIRST IN MD ENCOUNTER AT 9:30 AND NO DILATION then pERG first and mfERG second then MD last  Message = chart at  for BD/TT (2 appts)

## 2022-12-01 NOTE — NURSING NOTE
Chief Complaints and History of Present Illnesses   Patient presents with     Follow Up     Chief Complaint(s) and History of Present Illness(es)     Follow Up            Laterality: both eyes    Course: stable    Associated symptoms: Negative for eye pain, flashes and floaters    Treatments tried: no treatments          Comments    Here for retinal follow up. Vision is stable - blurry vision. Denies flashes or floaters. No eye pain.    Robert Godinez COT 9:47 AM December 1, 2022

## 2022-12-01 NOTE — PROGRESS NOTES
I have confirmed the patient's and reviewed Past Medical History, Past Surgical History, Social History, Family History, Problem List, Medication List and agree with Tech note.    CC: new retina evaluation    HPI: Referred by Dr. Vo for evaluation of bilateral peripheral vision decrease describes a layer of smoke in front of both eyes left eye worse than right eye, difficulty reading and distance. Worsening vision started at time of diabetes diagnosis in 2020. Difficulty seeing at night time. Vision fluctuates, some days its blurry and some days its more clear but never goes back to normal.      Blood sugars well controlled, A1C below.    A1C%:  5.7% 2/15/2022   5.7% 9/7/2021  6.1% 5/19/2021  5.6% 1/29/2021  7% 7/10/2020      Medical history  Diabetes mellitus type II  Hypertension  Asthma  Beta-thalassemia trait    No history of ocular surgery     OCT macula 6/9/2022  RE: vitreo-macular adhesion, good foveal contour and retinal laminations  LE: good foveal contour and retinal laminations    FAF 6/9/22  peripapillary hypoautofluorescence both eyes     OCT RNFL 6/9/22  RE: nasal thinning   LE: Within normal limits     Assessment/plan:  1. Decreased vision in both eyes  Associated with nyctalopia, decreased color vision, peripapillary atrophy out of proportion to myopia  Trouble with hearing in the past year  No known family history of undiagnosed vision loss   ffERG to evaluate cone rode function is normal   Ocult Macular Dystrophy? wll order mfERG     2. Diabetes mellitus type II for 3 years   Under good control, continue to manage under PCP supervision    3. Nuclear sclerosis, both eyes   Unlikely visually significant       4. Mild myopia and astigmatism both eyes  Refracts to 20/60 right eye, and 20/100 left eye with spherical equivalent of ~ -0.50 both eyes    RTC mfERG and PERG both eyes and full in 6 weeks           Fang Willson MD PhD.  Professor & Chair    STANDARD ffERG REPORT     ffERG Date:   2022     Referring:   Kofi Willson MD, PhD      RE:          Maria T Pineda  MRN:     [id number]  :      1960     CLINICAL HISTORY: Referred by Dr. Vo to Dr. Willson for decreased vision, peripheral vision loss both eyes. Last eye exam w/Dr. Vo 22:  Pt notes she has always needed glasses, but with correction has had good vision in the past. No recent episodes of vision loss. She just assumed her vision loss was from diabetes. Exam fairly unremarkable - no bone spicules, no attenuation of vessels. Last eye exam with Dr. Willson 22:  Decreased vision in both eyes. Associated with nyctalopia, decreased color vision, peripapillary atrophy out of proportion to myopia. Trouble with hearing in the past year. No known family history of undiagnosed vision loss. Recommend ffERG to evaluate cone melia function. +DMII, +Beta-thalassemia trait.  +Malawian  over the phone.                                                                                                                                                                         Visual Acuity without glasses       Right Eye:            20/60-, PHNI                                                              Left Eye:              20/125, PH 20/100                                                                                                                                                                                                                                                                                                                                                   ALL AVERAGED                   Data for Full-Field ERG                            Right Eye   Left Eye   DARK-ADAPTED Patient Normal Patient   0.01 ERG (melia) b-wave amplitude ( v) 203 69.6 to 348.2 213   0.01 ERG (melia) b-wave implicit time (ms) 96.5 78.2 to 117.2 99.8             3.0 ERG (combined) a-wave amplitude ( v) -118 -259.4 to -98  -132   3.0 ERG (combined) a-wave implicit time (ms) 23.3 11.5 to 20.3 23.9   3.0 ERG (combined) b-wave amplitude ( v) 281 159.2 to 421.6 298   3.0 ERG (combined) b-wave implicit time (ms) 53.3 41.2 to 57.2 54.4             10.0 ERG(brighter combined) a-wave amplitude ( v) -150 -291 to -110 -158   10.0 ERG(brighter combined) a-wave implicit time (ms) 14.4 9.9 to 15.1 15   10.0 ERG(brighter combined) b-wave amplitude ( v) 281 191.5 to 403.1 297   10.0 ERG(brighter combined) b-wave implicit time (ms) 52.1 39.1 to 55.3 52.1             3.0 Oscillatory Potentials   Present      LIGHT-ADAPTED                                                              3.0 Flicker (30Hz) amplitude ( v) 68 56.4 to 151.2 79   3.0 Flicker (30Hz) implicit time (ms) 28.3 21.8 to 31.6 30             3.0 ERG (cone) a-wave amplitude ( v) -16 -45.1 to -13.7 -20   3.0 ERG (cone) a-wave implicit time (ms) 15.5 11.4 to 16.8 16.1   3.0 ERG (cone) b-wave amplitude ( v) 75 62.4 to 174.2 85   3.0 ERG (cone) b-wave implicit time (ms) 32.2 26.8 to 34.2 32.2   * = manipulated cursors  parentheses = cursors at selected peaks  ---- = residual to non-measurable  xxxx = not tested       Tech notes:  ffERG discussed and performed w/E3 system.  +Tristanian  over the phone.  Equally well-dilated ~8.5mm.  Tolerated dtl nicely.  Equal and adequate eye opening w/easy effort to clear dilated pupils. Impedances low and comparable throughout.  No difficulty w/blinking.  Specifically, no eyelid flutter to bright flashes and only slight flutter with flicker.       INTERPRETATION:   This electroretinogram was performed according to ISCEV standards using the ESPION E3 system and DTL fiber-recording electrodes. The patient tolerated the testing well. The waveforms are fairly reproducible and well formed. The responses in between both eyes were similar. The normative values provided above represent the 95% confidence limits for a normal individual the age of the  patient. The patient s responses are averaged.     In scotopic conditions, the melia-specific responses were normal in both eyes. The maximal response, a combined melia and cone response, was essentially normal and the implicit time was normal in both eyes. The implicit time was delayed for the a-wave combined responses in both eyes. The brighter combined responses were not electronegative in both eyes.     In light-adapted conditions, the 30-Hz flicker response has a normal amplitude and normal implicit time in both eyes. The single photopic flash responses are normal.     CONCLUSION:  This represents a likely normal electroretinogram. There is no evidence of significant melia or cone photoreceptor abnormalities in either eye. There is delay in the implicit time in the a-wave dark-adapated responses of unclear significance. Clinical correlation is recommended.        I would recommend a repeat electroretinogram in a couple of years if there continues to be concern regarding a possible retinal dystrophy.      Mathew Kirby, thank you for the opportunity to provide electrophysiologic services for this patient.  Please do not hesitate to call if there should be any questions regarding these results.     Rolanda Caraballo MD  Professor of ophthalmology  Retina Service   Department of Ophthalmology and Visual Neurosciences   [facility]  Phone: [phone]   Fax: [phone]

## 2023-01-12 ENCOUNTER — OFFICE VISIT (OUTPATIENT)
Dept: OPHTHALMOLOGY | Facility: CLINIC | Age: 63
End: 2023-01-12
Attending: OPHTHALMOLOGY
Payer: COMMERCIAL

## 2023-01-12 DIAGNOSIS — E11.9 TYPE 2 DIABETES MELLITUS WITHOUT COMPLICATION, WITHOUT LONG-TERM CURRENT USE OF INSULIN (H): ICD-10-CM

## 2023-01-12 DIAGNOSIS — H35.54 OCCULT MACULAR DYSTROPHY: Primary | ICD-10-CM

## 2023-01-12 PROCEDURE — 999N000103 HC STATISTIC NO CHARGE FACILITY FEE

## 2023-01-12 PROCEDURE — G0463 HOSPITAL OUTPT CLINIC VISIT: HCPCS | Mod: 25

## 2023-01-12 PROCEDURE — 99213 OFFICE O/P EST LOW 20 MIN: CPT | Performed by: OPHTHALMOLOGY

## 2023-01-12 ASSESSMENT — VISUAL ACUITY
OS_SC: 20/100
OS_SC+: -2
OD_SC: 20/40
METHOD: SNELLEN - LINEAR
METHOD: SNELLEN - LINEAR
OS_SC: 20/100
OD_SC: 20/40

## 2023-01-12 ASSESSMENT — TONOMETRY
OD_IOP_MMHG: 19
OS_IOP_MMHG: 18
IOP_METHOD: TONOPEN

## 2023-01-12 ASSESSMENT — CONF VISUAL FIELD
OS_INFERIOR_NASAL_RESTRICTION: 3
OS_INFERIOR_TEMPORAL_RESTRICTION: 3

## 2023-01-12 NOTE — Clinical Note
Smiley,  this patient has electroretinogram (ERG) findings consistent with occult macular dystrophy. I'm referring her to the clinic on mondays, and she really only need a genetic testing ordered .  Thank you.  Dr Willson

## 2023-01-12 NOTE — PROGRESS NOTES
Results review testing today    Patient had PERG and mfERG and both are consistent with Occult Macular Dystrophy.    Will refer to ophthalmic Genetic clinic for RP1L1 testing    Thank you.    Fang Willson MD PhD.  Professor & Chair

## 2023-01-12 NOTE — PROGRESS NOTES
pERG and mfERG done and Dr. Silva to interpret both.    2023    STANDARD pERG and mfERG REPORT    Testing Date:  2023  Referring:      RE: Maria T Pineda  MRN: [id number]  : 1960                CLINICAL HISTORY:    IMPRESSION pERG:    IMPRESSION mfERG:    Visual Acuity Right Eye : 20/40      W/o gls    Visual Acuity Left Eye : 20/100      W/o gls       ALL AVERAGED   Data for Pattern ERG Right Eye  Amplitude ( v) Left Eye    Patient Normal Patient    P50 Amplitude 1.59(2.22) not avail 1.57(2.3)    N95  Amplitude -2.7(-5.07) not avail -3.2(-4.58)    N95 : P50 Ratio 1.7(2.28) not avail 2.03(1.99)   --- = residual to non-measurable      Parentheses = @50cm, otherwise data is @100cm           PRELIMINARY INTERPRETATION:        Sincerely,

## 2023-01-12 NOTE — NURSING NOTE
Returns for pERG+mfERG and Dr. Willson to interpret.  Prev ffERG 08-16-22, normal.  Last eye exam w/Dr. Willson 12-02-22:  Decreased vision in both eyes.  Associated with nyctalopia, decreased color vision, peripapillary atrophy out of proportion to myopia. Trouble with hearing in the past year. No known family history of undiagnosed vision loss.  ffERG to evaluate cone melia function was normal. Occult macular dystrophy? +DMII x3yrs under good control, +Beta-thalassemia trait.  Scheduled for return to [facility] same day.  Latvian  over phone.

## 2023-01-13 ENCOUNTER — APPOINTMENT (OUTPATIENT)
Dept: INTERPRETER SERVICES | Facility: CLINIC | Age: 63
End: 2023-01-13
Payer: COMMERCIAL

## 2023-01-17 NOTE — PROGRESS NOTES
Maria T is a 62 year old who is being evaluated via a billable telephone visit.      What phone number would you like to be contacted at? [phone]  How would you like to obtain your AVS? Mail a copy        GENETIC COUNSELING CONSULTATION NOTE    Date of visit: 01/20/23    Presenting Information:   Maria T Pineda is a 62 year old female referred to the [facility] due to occult macular dystrophy. She was seen for a telephone genetic counseling appointment at the request of Dr. Ortez today. We were assisted by a [facility] , Son (ID: [id number]), today.    Maria T is followed by Dr. Ortez in the Ophthalmology clinic. She was initially seen for concerns of nyctalopia, decreased color vision, and peripapillary atrophy out of proportion to myopia. Maria T reports that she first noticed changes with her vision about 5 years ago when she was in [place]. She has had PERG and mfERG on 1/12/23 and both are consistent with Occult Macular Dystrophy so Dr. Ortez referred her for genetic testing.     With regards to other health concerns, Maria T has diabetes mellitus type II, hypertension, asthma, and beta-thalassemia trait. She also has had some trouble with hearing in the past year.    Family History: A three generation pedigree was obtained and scanned into the electronic medical record. The relevant portions are described below:      Children-     37 year old son who is experiencing some vision issues and he wears very thick glasses (specific diagnosis not known). He has a 6 year old son who also wears glasses.    28 year old daughter who was diagnosed with diabetes at age 20. It is reported that this may be affecting her vision (?) but she also has poor vision. She has no children.    Siblings-     Three younger brothers, two of whom are reported to have vision concerns and need glasses. They are still living in [place]. Their children are reported to be healthy.     Three younger  "sisters. One sister is reported to have vision concerns and wears glasses. She lives in the [place]. She has no children. Other two sisters have good vision and are healthy as are their children.    Parents-     Mother passed away in her 70's due to an unknown cause but she had a stroke a few years before she passed away. No specific vision concerns.     Father passed away at age 63 due to lung cancer. He wore glasses, no other specific vision details known.     Maternal Relatives-     No maternal aunts, uncles, or cousins.     Maternal grandparents are .    Paternal Relatives-     One paternal uncle is reported to have lost vision in one eye and needed strong glasses to be able to see in his other eye. Doctors told him that \"this runs in the family\" but a specific diagnosis is not reported.     Paternal grandparents are     Family history is otherwise largely non-contributory. Maternal and paternal ancestry is [place]. Consanguinity was denied.     Genetic Counseling Discussion:  For review, our bodies are made of cells that contain our chromosomes which are made up of long stretches of DNA containing our genes. Our genes serve as the instructions for our bodies to grow and function. We have two copies of each gene, one inherited from our mother and one inherited from our father.     RP1L1 gene and inheritance:  Pathogenic variant in the RP1L1 gene have been reported to cause autosomal dominant occult macular dystrophy and retinitis pigmentosa:  Occult macular dystrophy (OMD) affects the macula of the eye and causes a slowly progressive decrease of visual acuity. Individuals with OMD can have normal funduscopic appearance and normal full-field ERGs. Photophobia (light sensitivity) is another common symptom of OMD. In one study assessing 61 individuals with [id number]-[id number] OMD, the average of onset of vision symptoms of OMD was 30 but symptom onset ranged from ages 3 to 60. This same study " "found that best visual acuity decreased steadily for 10-15 years from symptoms onset but then became stable.     AC1V5-qthhwzw OMD is inherited in an autosomal dominant inheritance pattern. Autosomal dominant means an individual needs a single pathogenic/likely pathogenic variant  on one copy of the gene in order to be affected. Individuals who have a dominant condition have a 1 in 2 (50%) chance of passing their variant and the condition to each child.     Less commonly, pathogenic variants in the RP1L1 gene cause retinitis pigmentosa (RP) is a group of disorders characterized by abnormalities of the photoreceptors (rods and cones) of the eye that lead to progressive vision loss. The initial symptom in individuals with RP is defective dark adaptation, or \"night blindness,\" due to melia dysfunction. This is followed by cone dysfunction and loss of peripheral vision. Central visual acuity is usually preserved until the end stages of RP. Individuals with RP can also have other eye findings such as cataracts, dust-like particles in the vitreous of the eye.     RK5C8-hjazpgs RP has been reported to be inherited in an autosomal recessive inheritance pattern. Autosomal recessive means an individual needs two likely pathogenic/pathogenic variants, one on each copy of the gene, in order to be affected with the condition. When an individual only has one variant in the gene, they are considered a carrier for the condition. When both parents are carriers for the same recessive condition, there is a 1 in 4 (25%) chance of having an affected child, a 1 in 2 (50%) chance of having a child who is an unaffected carrier, and a 1 in 4 (25%) chance of having a child who is neither affected nor a carrier with each pregnancy.     We reviewed the availability of genetic testing via Next Generation Sequencing (NGS) for analysis of the RP1L1 gene, with the aim of determining what condition is causing Thi's vision symptoms. Since this is the " most likely diagnosis given Maria T's vision symptoms, we will start with the RP1L1 gene and if these results are inconclusive we can reflex to a larger inherited retinal disorders panel which would analyze 330 different genes associated with various retinal conditions.    We went on to discuss the details, limitations, and possible outcomes of NGS. In particular, we discussed that there are three possible results from NGS:    Negative: meaning normal or no mutations are identified in the genes that were tested/sequenced    Positive: meaning a mutation that is known to be associated with a particular set of symptoms is identified    Variant of uncertain significance (VUS): meaning a change in the DNA sequence of a particular gene was seen but there is not enough information or data yet to know if it explains the symptoms. If a VUS is identified, testing of other relatives may be helpful to provide clarification.  In most cases, identification of a VUS does not confirm a diagnosis and does not result in any clinically actionable recommendations.    We discussed the potential benefits of genetic testing and why this genetic testing is medically indicated. A positive result will help determine the etiology of the OMD noted in Maria T and may guide the medical management for her. Also, if a genetic cause is found for Maria T's vision symptoms, it will give us a more accurate risk assessment for other family members, particularly Maria T's children and grandchildren.     Next Generation Sequencing is a well established technology utilized by all molecular genetic labs throughout the country for identifying disease-causing mutations in various genes.  Next Generation Sequencing is currently the standard of care for genetic testing of single genes.  The recommended testing for Maria T  is DIAGNOSTIC testing, and it is NOT investigational.    Maria T consented to genetic testing today. Maria T will be mailed a buccal kit for sample collection. We  reviewed the instructions for buccal sample collection and she will be sent a [employer] pre-paid return label/envelope to send the sample back to the lab. I will call her with the results about 2-3 weeks after the lab receives her sample.     It was a pleasure meeting [facility] today. She was encouraged to reach out to me if she has any further questions.     Plan:  1. [facility] RP1L1 gene testing with reflex to larger IRD panel if negative.  2. [facility] will be mailed a buccal kit for sample collection. I will call her with results about 2-3 weeks after the lab receives her sample.      Portia Aldrich, MS, [facility]  Licensed Genetic Counselor   [facility]  Phone: [phone]  Fax: [phone]    Time spent in consultation via telephone was approximately 50 minutes.    References:  Laz N, Tsselene K, Mizlenio Y, ui T, Ankitaase T, Ueno S, Robienmai K, Akua T, Katagiri S, Kondo M, Kameya S, Yoshitakolton K, Fujgraciela K, Iwata T, Pretty Y. Clinical Stages of Occult Macular Dystrophy Based on Optical Coherence Tomographic Findings. Invest Ophthalmol Vis Sci. 2019 Nov 1;60(14):6011-9781. doi: 10.1167/iovs.19-16192. PMID: 70060475.    Ken Escobedo. RP1L1 and inherited photoreceptor disease: A review. Surv Ophthalmol. 2020 Nov-Dec;65(6):725-739. doi: 10.1016/j.survophthal.2020.04.005. Epub 2020 Apr 30. PMID: 64222529.

## 2023-01-20 ENCOUNTER — VIRTUAL VISIT (OUTPATIENT)
Dept: CONSULT | Facility: CLINIC | Age: 63
End: 2023-01-20
Attending: GENETIC COUNSELOR, MS
Payer: COMMERCIAL

## 2023-01-20 VITALS — WEIGHT: 140 LBS | BODY MASS INDEX: 25.76 KG/M2 | HEIGHT: 62 IN

## 2023-01-20 DIAGNOSIS — H35.54 OCCULT MACULAR DYSTROPHY: Primary | ICD-10-CM

## 2023-01-20 DIAGNOSIS — Z71.83 ENCOUNTER FOR NONPROCREATIVE GENETIC COUNSELING: ICD-10-CM

## 2023-01-20 PROCEDURE — 96040 HC GENETIC COUNSELING, EACH 30 MINUTES: CPT | Mod: TEL,95 | Performed by: GENETIC COUNSELOR, MS

## 2023-01-20 ASSESSMENT — PAIN SCALES - GENERAL: PAINLEVEL: NO PAIN (0)

## 2023-01-20 NOTE — LETTER
1/20/2023      RE: Maria T Pineda  [id number] [address] [address]     Dear Colleague,    Thank you for the opportunity to participate in the care of your patient, Maria T Pineda, at the [facility] at [facility]. Please see a copy of my visit note below.        GENETIC COUNSELING CONSULTATION NOTE    Date of visit: 01/20/23    Presenting Information:   Maria T Pineda is a 62 year old female referred to the [facility] due to occult macular dystrophy. She was seen for a telephone genetic counseling appointment at the request of Dr. Ortez today. We were assisted by a [place] , Son (ID: [id number]), today.    Maria T is followed by Dr. Ortez in the Ophthalmology clinic. She was initially seen for concerns of nyctalopia, decreased color vision, and peripapillary atrophy out of proportion to myopia. Maria T reports that she first noticed changes with her vision about 5 years ago when she was in [place]. She has had PERG and mfERG on 1/12/23 and both are consistent with Occult Macular Dystrophy so Dr. Ortez referred her for genetic testing.     With regards to other health concerns, Maria T has diabetes mellitus type II, hypertension, asthma, and beta-thalassemia trait. She also has had some trouble with hearing in the past year.    Family History: A three generation pedigree was obtained and scanned into the electronic medical record. The relevant portions are described below:      Children-     37 year old son who is experiencing some vision issues and he wears very thick glasses (specific diagnosis not known). He has a 6 year old son who also wears glasses.    28 year old daughter who was diagnosed with diabetes at age 20. It is reported that this may be affecting her vision (?) but she also has poor vision. She has no children.    Siblings-     Three younger brothers, two of whom  "are reported to have vision concerns and need glasses. They are still living in [place]. Their children are reported to be healthy.     Three younger sisters. One sister is reported to have vision concerns and wears glasses. She lives in [place]. She has no children. Other two sisters have good vision and are healthy as are their children.    Parents-     Mother passed away in her 70's due to an unknown cause but she had a stroke a few years before she passed away. No specific vision concerns.     Father passed away at age 63 due to lung cancer. He wore glasses, no other specific vision details known.     Maternal Relatives-     No maternal aunts, uncles, or cousins.     Maternal grandparents are .    Paternal Relatives-     One paternal uncle is reported to have lost vision in one eye and needed strong glasses to be able to see in his other eye. Doctors told him that \"this runs in the family\" but a specific diagnosis is not reported.     Paternal grandparents are     Family history is otherwise largely non-contributory. Maternal and paternal ancestry is Greek. Consanguinity was denied.     Genetic Counseling Discussion:  For review, our bodies are made of cells that contain our chromosomes which are made up of long stretches of DNA containing our genes. Our genes serve as the instructions for our bodies to grow and function. We have two copies of each gene, one inherited from our mother and one inherited from our father.     RP1L1 gene and inheritance:  Pathogenic variant in the RP1L1 gene have been reported to cause autosomal dominant occult macular dystrophy and retinitis pigmentosa:  Occult macular dystrophy (OMD) affects the macula of the eye and causes a slowly progressive decrease of visual acuity. Individuals with OMD can have normal funduscopic appearance and normal full-field ERGs. Photophobia (light sensitivity) is another common symptom of OMD. In one study assessing 61 " "individuals with [id number]-ocfctnx OMD, the average of onset of vision symptoms of OMD was 30 but symptom onset ranged from ages 3 to 60. This same study found that best visual acuity decreased steadily for 10-15 years from symptoms onset but then became stable.     RT2B9-xtctuor OMD is inherited in an autosomal dominant inheritance pattern. Autosomal dominant means an individual needs a single pathogenic/likely pathogenic variant  on one copy of the gene in order to be affected. Individuals who have a dominant condition have a 1 in 2 (50%) chance of passing their variant and the condition to each child.     Less commonly, pathogenic variants in the RP1L1 gene cause retinitis pigmentosa (RP) is a group of disorders characterized by abnormalities of the photoreceptors (rods and cones) of the eye that lead to progressive vision loss. The initial symptom in individuals with RP is defective dark adaptation, or \"night blindness,\" due to melia dysfunction. This is followed by cone dysfunction and loss of peripheral vision. Central visual acuity is usually preserved until the end stages of RP. Individuals with RP can also have other eye findings such as cataracts, dust-like particles in the vitreous of the eye.     RF7J3-mnsrizn RP has been reported to be inherited in an autosomal recessive inheritance pattern. Autosomal recessive means an individual needs two likely pathogenic/pathogenic variants, one on each copy of the gene, in order to be affected with the condition. When an individual only has one variant in the gene, they are considered a carrier for the condition. When both parents are carriers for the same recessive condition, there is a 1 in 4 (25%) chance of having an affected child, a 1 in 2 (50%) chance of having a child who is an unaffected carrier, and a 1 in 4 (25%) chance of having a child who is neither affected nor a carrier with each pregnancy.     We reviewed the availability of genetic testing via Next " Generation Sequencing (NGS) for analysis of the RP1L1 gene, with the aim of determining what condition is causing Maria T's vision symptoms. Since this is the most likely diagnosis given Maria T's vision symptoms, we will start with the RP1L1 gene and if these results are inconclusive we can reflex to a larger inherited retinal disorders panel which would analyze 330 different genes associated with various retinal conditions.    We went on to discuss the details, limitations, and possible outcomes of NGS. In particular, we discussed that there are three possible results from NGS:    Negative: meaning normal or no mutations are identified in the genes that were tested/sequenced    Positive: meaning a mutation that is known to be associated with a particular set of symptoms is identified    Variant of uncertain significance (VUS): meaning a change in the DNA sequence of a particular gene was seen but there is not enough information or data yet to know if it explains the symptoms. If a VUS is identified, testing of other relatives may be helpful to provide clarification.  In most cases, identification of a VUS does not confirm a diagnosis and does not result in any clinically actionable recommendations.    We discussed the potential benefits of genetic testing and why this genetic testing is medically indicated. A positive result will help determine the etiology of the OMD noted in Maria T and may guide the medical management for her. Also, if a genetic cause is found for Maria T's vision symptoms, it will give us a more accurate risk assessment for other family members, particularly Maria T's children and grandchildren.     Next Generation Sequencing is a well established technology utilized by all molecular genetic labs throughout the country for identifying disease-causing mutations in various genes.  Next Generation Sequencing is currently the standard of care for genetic testing of single genes.  The recommended testing for Maria T  is  DIAGNOSTIC testing, and it is NOT investigational.    Maria T consented to genetic testing today. Maria T will be mailed a buccal kit for sample collection. We reviewed the instructions for buccal sample collection and she will be sent a [employer] pre-paid return label/envelope to send the sample back to the lab. I will call her with the results about 2-3 weeks after the lab receives her sample.     It was a pleasure meeting Maria T today. She was encouraged to reach out to me if she has any further questions.     Plan:  1. [facility] RP1L1 gene testing with reflex to larger IRD panel if negative.  2. Maria T will be mailed a buccal kit for sample collection. I will call her with results about 2-3 weeks after the lab receives her sample.      Portia Aldrich MS, [facility]  Licensed Genetic Counselor   [facility]  Phone: [phone]  Fax: [phone]    Time spent in consultation via telephone was approximately 50 minutes.    References:  Laz N, Tsunoda K, Mizuno Y, Usui T, Hatase T, Ueno S, Robienmai K, Akua T, Katagiri S, Kondo M, Kamalenaa S, Yoshitakolton K, Fujinami K, Iwata T, Miyake Y. Clinical Stages of Occult Macular Dystrophy Based on Optical Coherence Tomographic Findings. Invest Ophthalmol Vis Sci. 2019 Nov 1;60(14):1663-4425. doi: 10.1167/iovs.19-10284. PMID: [id number].    Ken Escobedo. RP1L1 and inherited photoreceptor disease: A review. Surv Ophthalmol. 2020 Nov-Dec;65(6):725-739. doi: 10.1016/j.survophthal.2020.04.005. Epub 2020 Apr 30. PMID: 10126512.    Please do not hesitate to contact me if you have any questions/concerns.     Sincerely,       Airam Aldrich, GC

## 2023-01-24 ENCOUNTER — APPOINTMENT (OUTPATIENT)
Dept: INTERPRETER SERVICES | Facility: CLINIC | Age: 63
End: 2023-01-24
Payer: COMMERCIAL

## 2023-01-24 ENCOUNTER — TELEPHONE (OUTPATIENT)
Dept: CONSULT | Facility: CLINIC | Age: 63
End: 2023-01-24
Payer: COMMERCIAL

## 2023-01-24 NOTE — TELEPHONE ENCOUNTER
Thi called to check on the cost of testing. I told her that I have confirmed with the lab that cost of testing is $0. She said she would like to proceed with the genetic testing. She should be getting the buccal kit in the mail this week and I told her she could call me again if she has more questions about the sample collection.     We were assisted by a [place]  on our call today.    Portia Aldrich MS, [facility]  Licensed Genetic Counselor  [facility]  Phone: [phone]  Fax: [phone]

## 2023-03-02 ENCOUNTER — APPOINTMENT (OUTPATIENT)
Dept: INTERPRETER SERVICES | Facility: CLINIC | Age: 63
End: 2023-03-02
Payer: COMMERCIAL

## 2023-03-02 ENCOUNTER — TELEPHONE (OUTPATIENT)
Dept: CONSULT | Facility: CLINIC | Age: 63
End: 2023-03-02
Payer: COMMERCIAL

## 2023-03-02 NOTE — TELEPHONE ENCOUNTER
I spoke with Maria T with the assistance of [facility] , Son. We reviewed the results of her genetic testing. The results of this test potentially explain Maria T's vision symptoms. 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       ABCA4 gene:  We all have two copies of the ABCA4 gene, one we inherit from our mother and one we inherit from our father. The ABCA4 gene provides instructions for making a protein that is found in the retina, the specialized light-sensitive tissue that lines the back of the eye. Specifically, the ABCA4 protein is produced in the retina's light receptor cells (photoreceptors). The ABCA4 protein is active following phototransduction, the process by which light entering the eye is converted into electrical signals that are transmitted to the brain. Phototransduction leads to the formation of potentially toxic substances that can damage photoreceptor cells. The ABCA4 protein removes one of these substances and keeps our retinas healthy.    Classically, the ABCA4 gene has been associated with causing autosomal recessive Stargardt disease, or Stargardt macular degeneration, which is the most common form of juvenile macular degeneration.Stargardt disease is a genetic eye condition that causes progressive vision loss due to an accumulation of fatty yellow pigment (lipofuscin) in the macula causing macular degeneration. Stargardt disease causes sharp central vision loss, which is used for tasks such as reading, driving, and looking at faces, but some peripheral vision is usually preserved. Individuals with this condition may also have difficulty with night vision and they may have impaired color vision. Signs and symptoms of vision loss begin in childhood to early adulthood and progress over time.    More recently, clinical presentations of other retinal dystrophies have been reported be be caused by mutations in the ABCA4 gene. Retinal  dystrophy is a broad category of eye conditions that are all associated with breakdown or degeneration of the retina.  Depending on the specific condition, this degeneration can affect the various different cells types of the retina, and therefore, the specific symptoms can vary significantly from one retinal dystrophy condition to another.  Most commonly affected areas of the retina include the photoreceptors (rods and cones) or the retinal pigment epithelium (RPE). Variants in the ABCA4 gene have been reported in families with autosomal recessive cone-melia dystrophy, fundus flavimaculatus, and retinitis pigmentosa (RP). The age of onset and presentation of symptoms can vary among affected individuals.    There is currently no treatment for ABCA4-related retinal disorders, but it is a gene of interest for several therapy approaches. Individuals with ABCA4-related disorder should avoid taking supplemental vitamins containing more than the recommended daily allowance of vitamin A, since large amounts of vitamin A may make the disease progress faster. They should also wear sunglasses to protect their eyes from harmful UV. There is ongoing research for a gene replacement therapy to put a working copy of the ABCA4 gene into the eye where it is needed, stem cell therapy to replace damaged cells in the retina, and drug therapies aimed at reducing the amount of lipofuscin that damages the retina. For more up-to-date information about these clinical trials, go to: clinicaltrials.gov    Inheritance of ABCA4-related disorders:  ABCA4-related disorders are inherited in an autosomal recessive inheritance pattern meaning two likely pathogenic/pathogenic variants, one on each copy of the ABCA4 gene, cause the condition. The parents of a child with ABCA4-related disorders each carry one copy of a mutated ABCA4 gene and are unaffected carriers, but with each pregnancy there is a 25% chance the child will inherit both parental  mutations and have ABCA4-related disorders, a 50% chance of inheriting just one mutation and being an unaffected carrier of ABCA4-related disorders, and a 25% chance of inheriting no mutations and neither being affected nor a carrier. Despite advances in genetic testing, complete sequencing of the ABCA4 gene identifies biallelic mutations (one mutation on each copy of the gene) in 60-70% of cases. About 20-25% of individuals with the clinical presentation of Stargardt disease or other ABCA4-related disorder have only one identifiable ABCA4 mutation and the remainder have no mutations identified but clinically fit the picture of ABCA4-related disease.     Interpreting Maria T's results:  For Maria T, her vision symptoms have so far been most consistent with the diagnosis of occult macular dystrophy. It is interesting that the ABCA4 gene most commonly affects the macula of the eye as well. The specific mutation found in Maria T called c.6119G>A has been well documented to cause Stargardt disease and cone-melia dystrophy. There has also been one report in the medical literature (PMID [id number]) of a family who had occult macular dystrophy who had this specific ABCA4 mutation (c.6119G>A) in addition to a mutation in the RP1L1 gene and it is thought that both mutations contributed to the family's macular dystrophy since every affected individual had both mutations. It is very important to keep in mind that approximately 20-25% of individuals with ABCA4-related disorder only have one mutation found on testing because of limitations in our current testing technologies. Because Maria T possibly fits with ABCA4-related disorders, this single ABCA4 mutation may be the explanation for her symptoms and our current testing technology just cannot find her second mutation. It is also possible that she is just a carrier for ABCA4-related disorders and the testing has not found her genetic cause. Regardless, Maria T should continue to follow with Dr. Nayak  Brandin in Ophthalmology as he recommends.     Plan:  1. I will summarize these results in a letter to be translated to [place] for Thi. I will send this to her with a copy of her test report.   2. Thi should continue with planned Ophthalmology follow-up on 10/12/23    Portia Aldrich MS, [facility]  Licensed Genetic Counselor  [facility]  Phone: [phone]  Fax: [phone]    Reference:  Violette QUINONEZ, Meek FJ, Bakari FY, Ari SH, Marlene CHAVEZY, Giorgio LEJ, Alec CHASE, Fredy M, Barbie DK, Tre JH, Luis A GZ. Next-Generation Sequencing-Aided Rapid Molecular Diagnosis of Occult Macular Dystrophy in a [place] Family. [employer]. 2017 Aug 25;8:107. doi: 10.3389/fgene.2017.25219. PMID: 16226081; PMCID: [id number].

## 2023-03-03 ENCOUNTER — LAB REQUISITION (OUTPATIENT)
Dept: LAB | Facility: CLINIC | Age: 63
End: 2023-03-03
Payer: COMMERCIAL

## 2023-03-03 DIAGNOSIS — I10 ESSENTIAL (PRIMARY) HYPERTENSION: ICD-10-CM

## 2023-03-03 DIAGNOSIS — D56.3 THALASSEMIA MINOR: ICD-10-CM

## 2023-03-03 DIAGNOSIS — Z13.220 ENCOUNTER FOR SCREENING FOR LIPOID DISORDERS: ICD-10-CM

## 2023-03-03 DIAGNOSIS — E11.9 TYPE 2 DIABETES MELLITUS WITHOUT COMPLICATIONS (H): ICD-10-CM

## 2023-03-03 LAB
ANION GAP SERPL CALCULATED.3IONS-SCNC: 15 MMOL/L (ref 7–15)
BUN SERPL-MCNC: 10.3 MG/DL (ref 8–23)
CALCIUM SERPL-MCNC: 9.6 MG/DL (ref 8.8–10.2)
CHLORIDE SERPL-SCNC: 102 MMOL/L (ref 98–107)
CHOLEST SERPL-MCNC: 162 MG/DL
CREAT SERPL-MCNC: 0.59 MG/DL (ref 0.51–0.95)
DEPRECATED HCO3 PLAS-SCNC: 20 MMOL/L (ref 22–29)
GFR SERPL CREATININE-BSD FRML MDRD: >90 ML/MIN/1.73M2
GLUCOSE SERPL-MCNC: 129 MG/DL (ref 70–99)
HBA1C MFR BLD: 7.2 %
HDLC SERPL-MCNC: 54 MG/DL
LDLC SERPL CALC-MCNC: 84 MG/DL
NONHDLC SERPL-MCNC: 108 MG/DL
POTASSIUM SERPL-SCNC: 4 MMOL/L (ref 3.4–5.3)
SODIUM SERPL-SCNC: 137 MMOL/L (ref 136–145)
TRIGL SERPL-MCNC: 120 MG/DL

## 2023-03-03 PROCEDURE — 83036 HEMOGLOBIN GLYCOSYLATED A1C: CPT | Performed by: INTERNAL MEDICINE

## 2023-03-03 PROCEDURE — 80061 LIPID PANEL: CPT | Mod: ORL | Performed by: INTERNAL MEDICINE

## 2023-03-03 PROCEDURE — 80048 BASIC METABOLIC PNL TOTAL CA: CPT | Performed by: INTERNAL MEDICINE

## 2023-05-03 ENCOUNTER — LAB REQUISITION (OUTPATIENT)
Dept: LAB | Facility: CLINIC | Age: 63
End: 2023-05-03
Payer: COMMERCIAL

## 2023-05-03 DIAGNOSIS — Z12.11 ENCOUNTER FOR SCREENING FOR MALIGNANT NEOPLASM OF COLON: ICD-10-CM

## 2023-05-03 LAB — HEMOCCULT STL QL IA: NEGATIVE

## 2023-05-03 PROCEDURE — 82274 ASSAY TEST FOR BLOOD FECAL: CPT | Mod: ORL | Performed by: INTERNAL MEDICINE

## 2023-08-21 ENCOUNTER — APPOINTMENT (OUTPATIENT)
Dept: INTERPRETER SERVICES | Facility: CLINIC | Age: 63
End: 2023-08-21
Payer: COMMERCIAL

## 2023-08-21 ENCOUNTER — TELEPHONE (OUTPATIENT)
Dept: OPHTHALMOLOGY | Facility: CLINIC | Age: 63
End: 2023-08-21
Payer: COMMERCIAL

## 2023-09-05 ENCOUNTER — ANCILLARY PROCEDURE (OUTPATIENT)
Dept: GENERAL RADIOLOGY | Facility: CLINIC | Age: 63
End: 2023-09-05
Attending: INTERNAL MEDICINE
Payer: COMMERCIAL

## 2023-09-05 DIAGNOSIS — M54.41 MIDLINE LOW BACK PAIN WITH BILATERAL SCIATICA, UNSPECIFIED CHRONICITY: ICD-10-CM

## 2023-09-05 DIAGNOSIS — M54.42 MIDLINE LOW BACK PAIN WITH BILATERAL SCIATICA, UNSPECIFIED CHRONICITY: ICD-10-CM

## 2023-09-05 PROCEDURE — 72100 X-RAY EXAM L-S SPINE 2/3 VWS: CPT | Performed by: STUDENT IN AN ORGANIZED HEALTH CARE EDUCATION/TRAINING PROGRAM

## 2023-10-26 ENCOUNTER — LAB REQUISITION (OUTPATIENT)
Dept: LAB | Facility: CLINIC | Age: 63
End: 2023-10-26
Payer: COMMERCIAL

## 2023-10-26 DIAGNOSIS — E11.9 TYPE 2 DIABETES MELLITUS WITHOUT COMPLICATIONS (H): ICD-10-CM

## 2023-10-26 DIAGNOSIS — Z12.4 ENCOUNTER FOR SCREENING FOR MALIGNANT NEOPLASM OF CERVIX: ICD-10-CM

## 2023-10-26 LAB
ALBUMIN SERPL BCG-MCNC: 4.5 G/DL (ref 3.5–5.2)
ALP SERPL-CCNC: 83 U/L (ref 35–104)
ALT SERPL W P-5'-P-CCNC: 28 U/L (ref 0–50)
ANION GAP SERPL CALCULATED.3IONS-SCNC: 14 MMOL/L (ref 7–15)
AST SERPL W P-5'-P-CCNC: 21 U/L (ref 0–45)
BILIRUB SERPL-MCNC: 0.7 MG/DL
BUN SERPL-MCNC: 13.6 MG/DL (ref 8–23)
CALCIUM SERPL-MCNC: 9.2 MG/DL (ref 8.8–10.2)
CHLORIDE SERPL-SCNC: 101 MMOL/L (ref 98–107)
CHOLEST SERPL-MCNC: 168 MG/DL
CREAT SERPL-MCNC: 0.59 MG/DL (ref 0.51–0.95)
CREAT UR-MCNC: 31.6 MG/DL
DEPRECATED HCO3 PLAS-SCNC: 22 MMOL/L (ref 22–29)
EGFRCR SERPLBLD CKD-EPI 2021: >90 ML/MIN/1.73M2
GLUCOSE SERPL-MCNC: 138 MG/DL (ref 70–99)
HDLC SERPL-MCNC: 54 MG/DL
LDLC SERPL CALC-MCNC: 84 MG/DL
MICROALBUMIN UR-MCNC: <12 MG/L
MICROALBUMIN/CREAT UR: NORMAL MG/G{CREAT}
NONHDLC SERPL-MCNC: 114 MG/DL
POTASSIUM SERPL-SCNC: 3.8 MMOL/L (ref 3.4–5.3)
PROT SERPL-MCNC: 7.5 G/DL (ref 6.4–8.3)
SODIUM SERPL-SCNC: 137 MMOL/L (ref 135–145)
TRIGL SERPL-MCNC: 148 MG/DL

## 2023-10-26 PROCEDURE — 80053 COMPREHEN METABOLIC PANEL: CPT | Mod: ORL | Performed by: INTERNAL MEDICINE

## 2023-10-26 PROCEDURE — 82570 ASSAY OF URINE CREATININE: CPT | Mod: ORL | Performed by: INTERNAL MEDICINE

## 2023-10-26 PROCEDURE — 80061 LIPID PANEL: CPT | Mod: ORL | Performed by: INTERNAL MEDICINE

## 2023-10-26 PROCEDURE — 87624 HPV HI-RISK TYP POOLED RSLT: CPT | Mod: ORL | Performed by: INTERNAL MEDICINE

## 2023-10-26 PROCEDURE — G0145 SCR C/V CYTO,THINLAYER,RESCR: HCPCS | Mod: ORL | Performed by: INTERNAL MEDICINE

## 2023-10-30 LAB
BKR LAB AP GYN ADEQUACY: NORMAL
BKR LAB AP GYN INTERPRETATION: NORMAL
BKR LAB AP HPV REFLEX: NORMAL
BKR LAB AP PREVIOUS ABNORMAL: NORMAL
PATH REPORT.COMMENTS IMP SPEC: NORMAL
PATH REPORT.COMMENTS IMP SPEC: NORMAL

## 2023-10-31 LAB
HUMAN PAPILLOMA VIRUS 16 DNA: NEGATIVE
HUMAN PAPILLOMA VIRUS 18 DNA: NEGATIVE
HUMAN PAPILLOMA VIRUS FINAL DIAGNOSIS: NORMAL
HUMAN PAPILLOMA VIRUS OTHER HR: NEGATIVE

## 2023-11-03 ENCOUNTER — APPOINTMENT (OUTPATIENT)
Dept: INTERPRETER SERVICES | Facility: CLINIC | Age: 63
End: 2023-11-03
Payer: COMMERCIAL

## 2023-11-06 ENCOUNTER — ANCILLARY PROCEDURE (OUTPATIENT)
Dept: MAMMOGRAPHY | Facility: CLINIC | Age: 63
End: 2023-11-06
Attending: INTERNAL MEDICINE
Payer: COMMERCIAL

## 2023-11-06 DIAGNOSIS — Z12.31 VISIT FOR SCREENING MAMMOGRAM: ICD-10-CM

## 2023-11-06 PROCEDURE — 77067 SCR MAMMO BI INCL CAD: CPT | Mod: 26 | Performed by: RADIOLOGY

## 2023-11-06 PROCEDURE — 77067 SCR MAMMO BI INCL CAD: CPT

## 2023-11-09 DIAGNOSIS — H35.54 OCCULT MACULAR DYSTROPHY: ICD-10-CM

## 2023-11-09 DIAGNOSIS — H35.89 OTHER SPECIFIED RETINAL DISORDERS: ICD-10-CM

## 2023-11-09 DIAGNOSIS — E11.9 TYPE 2 DIABETES MELLITUS WITHOUT COMPLICATION, WITHOUT LONG-TERM CURRENT USE OF INSULIN (H): Primary | ICD-10-CM

## 2023-11-16 ENCOUNTER — OFFICE VISIT (OUTPATIENT)
Dept: OPHTHALMOLOGY | Facility: CLINIC | Age: 63
End: 2023-11-16
Attending: OPHTHALMOLOGY
Payer: COMMERCIAL

## 2023-11-16 DIAGNOSIS — H35.54 OCCULT MACULAR DYSTROPHY: Primary | ICD-10-CM

## 2023-11-16 DIAGNOSIS — E11.9 TYPE 2 DIABETES MELLITUS WITHOUT COMPLICATION, WITHOUT LONG-TERM CURRENT USE OF INSULIN (H): ICD-10-CM

## 2023-11-16 PROCEDURE — G0463 HOSPITAL OUTPT CLINIC VISIT: HCPCS | Performed by: OPHTHALMOLOGY

## 2023-11-16 PROCEDURE — 92134 CPTRZ OPH DX IMG PST SGM RTA: CPT | Performed by: OPHTHALMOLOGY

## 2023-11-16 PROCEDURE — 99213 OFFICE O/P EST LOW 20 MIN: CPT | Performed by: OPHTHALMOLOGY

## 2023-11-16 ASSESSMENT — VISUAL ACUITY
OS_CC+: -2
CORRECTION_TYPE: GLASSES
OD_CC+: -2
METHOD: SNELLEN - LINEAR
OS_CC: 20/40
OD_CC: 20/30

## 2023-11-16 ASSESSMENT — EXTERNAL EXAM - RIGHT EYE: OD_EXAM: NORMAL

## 2023-11-16 ASSESSMENT — CONF VISUAL FIELD
OD_INFERIOR_NASAL_RESTRICTION: 3
OD_INFERIOR_TEMPORAL_RESTRICTION: 3
METHOD: COUNTING FINGERS
OD_SUPERIOR_NASAL_RESTRICTION: 3
OD_SUPERIOR_TEMPORAL_RESTRICTION: 3
OS_SUPERIOR_NASAL_RESTRICTION: 3
OS_INFERIOR_TEMPORAL_RESTRICTION: 3
OS_SUPERIOR_TEMPORAL_RESTRICTION: 3
OS_INFERIOR_NASAL_RESTRICTION: 3

## 2023-11-16 ASSESSMENT — CUP TO DISC RATIO
OS_RATIO: 0.3
OD_RATIO: 0.3

## 2023-11-16 ASSESSMENT — EXTERNAL EXAM - LEFT EYE: OS_EXAM: NORMAL

## 2023-11-16 NOTE — PROGRESS NOTES
I have confirmed the patient's and reviewed Past Medical History, Past Surgical History, Social History, Family History, Problem List, Medication List and agree with Tech note.      Results review testing today last visit     Patient had PERG and mfERG and both are consistent with Occult Macular Dystrophy.    Referred to ophthalmic Genetic clinic and was found negative for RP1L1 testing    Diabetes mellitus stable by OCT today    Return to clinic one year for dilated fundus exam Exam/OCT     Fang Willson MD PhD.  Professor & Chair

## 2023-11-16 NOTE — NURSING NOTE
"Chief Complaints and History of Present Illnesses   Patient presents with    Follow Up     Occult macular dystrophy  Type 2 diabetes mellitus without complication, without long-term current use of insulin     Chief Complaint(s) and History of Present Illness(es)       Follow Up              Comments: Occult macular dystrophy  Type 2 diabetes mellitus without complication, without long-term current use of insulin              Comments    Pt states no change in VA since last visit  Pt states no flashes, floaters eye pain or redness  Does not check BS Last A1C:  \"last week:  Lab Results       Component                Value               Date                       A1C                      7.2                 03/03/2023                  Miriam Pardo COT 11:44 AM November 16, 2023                          "

## 2023-11-21 ENCOUNTER — ALLIED HEALTH/NURSE VISIT (OUTPATIENT)
Dept: OPHTHALMOLOGY | Facility: CLINIC | Age: 63
End: 2023-11-21
Attending: OPHTHALMOLOGY
Payer: COMMERCIAL

## 2023-11-21 DIAGNOSIS — H53.8 BLURRY VISION: Primary | ICD-10-CM

## 2023-11-21 PROCEDURE — 99207 PR NO CHARGE COORDINATED CARE PS: CPT | Performed by: OPHTHALMOLOGY

## 2023-11-21 PROCEDURE — 92015 DETERMINE REFRACTIVE STATE: CPT

## 2023-11-21 ASSESSMENT — REFRACTION_MANIFEST
OS_AXIS: 010
OS_ADD: +2.75
OD_SPHERE: -1.75
OD_ADD: +2.75
OS_SPHERE: -2.50
OD_AXIS: 170
OD_CYLINDER: +1.00
OS_CYLINDER: +1.50

## 2023-11-21 ASSESSMENT — VISUAL ACUITY
OS_CC+: -2
OD_CC: 20/50
OS_CC: 20/50
METHOD: SNELLEN - LINEAR
OD_CC+: +2
CORRECTION_TYPE: GLASSES

## 2023-11-21 ASSESSMENT — REFRACTION_WEARINGRX
OD_CYLINDER: +1.00
OS_SPHERE: -2.50
OS_ADD: +2.25
OS_AXIS: 009
SPECS_TYPE: BIFOCAL
OD_SPHERE: -1.50
OS_CYLINDER: +1.50
OD_ADD: +2.25
OD_AXIS: 005

## 2023-11-21 NOTE — NURSING NOTE
Chief Complaints and History of Present Illnesses   Patient presents with    Follow Up     MRx check today.     Chief Complaint(s) and History of Present Illness(es)       Follow Up              Comments: MRx check today.              Comments    Pt here with  over the phone today.  Pt states that her glasses are 2 years old. Pt wants MRx today to see if she needs new glasses.    KERON Cornelius November 21, 2023 11:26 AM

## 2024-04-08 ENCOUNTER — LAB REQUISITION (OUTPATIENT)
Dept: LAB | Facility: CLINIC | Age: 64
End: 2024-04-08
Payer: COMMERCIAL

## 2024-04-08 DIAGNOSIS — E11.9 TYPE 2 DIABETES MELLITUS WITHOUT COMPLICATIONS (H): ICD-10-CM

## 2024-04-08 LAB
ANION GAP SERPL CALCULATED.3IONS-SCNC: 12 MMOL/L (ref 7–15)
BUN SERPL-MCNC: 12.9 MG/DL (ref 8–23)
CALCIUM SERPL-MCNC: 9.4 MG/DL (ref 8.8–10.2)
CHLORIDE SERPL-SCNC: 102 MMOL/L (ref 98–107)
CREAT SERPL-MCNC: 0.56 MG/DL (ref 0.51–0.95)
DEPRECATED HCO3 PLAS-SCNC: 24 MMOL/L (ref 22–29)
EGFRCR SERPLBLD CKD-EPI 2021: >90 ML/MIN/1.73M2
GLUCOSE SERPL-MCNC: 117 MG/DL (ref 70–99)
POTASSIUM SERPL-SCNC: 4.2 MMOL/L (ref 3.4–5.3)
SODIUM SERPL-SCNC: 138 MMOL/L (ref 135–145)

## 2024-04-08 PROCEDURE — 80048 BASIC METABOLIC PNL TOTAL CA: CPT | Mod: ORL | Performed by: INTERNAL MEDICINE

## 2024-09-04 ENCOUNTER — LAB REQUISITION (OUTPATIENT)
Dept: LAB | Facility: CLINIC | Age: 64
End: 2024-09-04
Payer: COMMERCIAL

## 2024-09-04 DIAGNOSIS — Z13.220 ENCOUNTER FOR SCREENING FOR LIPOID DISORDERS: ICD-10-CM

## 2024-09-04 DIAGNOSIS — Z12.11 ENCOUNTER FOR SCREENING FOR MALIGNANT NEOPLASM OF COLON: ICD-10-CM

## 2024-09-04 PROCEDURE — 80061 LIPID PANEL: CPT | Mod: ORL | Performed by: INTERNAL MEDICINE

## 2024-09-04 PROCEDURE — 82274 ASSAY TEST FOR BLOOD FECAL: CPT | Performed by: INTERNAL MEDICINE

## 2024-09-04 PROCEDURE — 82274 ASSAY TEST FOR BLOOD FECAL: CPT | Mod: ORL | Performed by: INTERNAL MEDICINE

## 2024-09-05 LAB
CHOLEST SERPL-MCNC: 175 MG/DL
FASTING STATUS PATIENT QL REPORTED: YES
HDLC SERPL-MCNC: 50 MG/DL
HEMOCCULT STL QL IA: NEGATIVE
LDLC SERPL CALC-MCNC: 90 MG/DL
NONHDLC SERPL-MCNC: 125 MG/DL
TRIGL SERPL-MCNC: 175 MG/DL

## 2024-09-26 ENCOUNTER — APPOINTMENT (OUTPATIENT)
Dept: INTERPRETER SERVICES | Facility: CLINIC | Age: 64
End: 2024-09-26
Payer: COMMERCIAL

## 2024-11-04 DIAGNOSIS — E11.9 TYPE 2 DIABETES MELLITUS WITHOUT COMPLICATION, WITHOUT LONG-TERM CURRENT USE OF INSULIN (H): Primary | ICD-10-CM

## 2024-12-09 ENCOUNTER — LAB REQUISITION (OUTPATIENT)
Dept: LAB | Facility: CLINIC | Age: 64
End: 2024-12-09
Payer: COMMERCIAL

## 2024-12-09 DIAGNOSIS — E11.9 TYPE 2 DIABETES MELLITUS WITHOUT COMPLICATIONS (H): ICD-10-CM

## 2024-12-09 LAB
CREAT UR-MCNC: 44.5 MG/DL
MICROALBUMIN UR-MCNC: 14.3 MG/L
MICROALBUMIN/CREAT UR: 32.13 MG/G CR (ref 0–25)

## 2024-12-09 PROCEDURE — 80061 LIPID PANEL: CPT | Mod: ORL | Performed by: INTERNAL MEDICINE

## 2024-12-09 PROCEDURE — 82043 UR ALBUMIN QUANTITATIVE: CPT | Mod: ORL | Performed by: INTERNAL MEDICINE

## 2024-12-09 PROCEDURE — 82465 ASSAY BLD/SERUM CHOLESTEROL: CPT | Performed by: INTERNAL MEDICINE

## 2024-12-09 PROCEDURE — 82570 ASSAY OF URINE CREATININE: CPT | Performed by: INTERNAL MEDICINE

## 2024-12-10 LAB
CHOLEST SERPL-MCNC: 172 MG/DL
FASTING STATUS PATIENT QL REPORTED: NO
HDLC SERPL-MCNC: 50 MG/DL
LDLC SERPL CALC-MCNC: 90 MG/DL
NONHDLC SERPL-MCNC: 122 MG/DL
TRIGL SERPL-MCNC: 161 MG/DL

## 2024-12-30 ENCOUNTER — TRANSFERRED RECORDS (OUTPATIENT)
Dept: HEALTH INFORMATION MANAGEMENT | Facility: CLINIC | Age: 64
End: 2024-12-30
Payer: COMMERCIAL

## 2025-01-05 ENCOUNTER — MEDICAL CORRESPONDENCE (OUTPATIENT)
Dept: HEALTH INFORMATION MANAGEMENT | Facility: CLINIC | Age: 65
End: 2025-01-05
Payer: COMMERCIAL

## 2025-01-06 ENCOUNTER — TRANSCRIBE ORDERS (OUTPATIENT)
Dept: OTHER | Age: 65
End: 2025-01-06

## 2025-01-06 DIAGNOSIS — Z12.11 COLON CANCER SCREENING: Primary | ICD-10-CM

## 2025-02-07 ENCOUNTER — MEDICAL CORRESPONDENCE (OUTPATIENT)
Dept: HEALTH INFORMATION MANAGEMENT | Facility: CLINIC | Age: 65
End: 2025-02-07
Payer: COMMERCIAL

## 2025-03-11 ENCOUNTER — LAB REQUISITION (OUTPATIENT)
Dept: LAB | Facility: CLINIC | Age: 65
End: 2025-03-11

## 2025-03-11 ENCOUNTER — MEDICAL CORRESPONDENCE (OUTPATIENT)
Dept: HEALTH INFORMATION MANAGEMENT | Facility: CLINIC | Age: 65
End: 2025-03-11
Payer: COMMERCIAL

## 2025-03-11 DIAGNOSIS — E11.9 TYPE 2 DIABETES MELLITUS WITHOUT COMPLICATIONS (H): ICD-10-CM

## 2025-03-11 LAB
RETICS # AUTO: 0.14 10E6/UL (ref 0.03–0.1)
RETICS/RBC NFR AUTO: 2.8 % (ref 0.5–2)

## 2025-03-11 PROCEDURE — 85045 AUTOMATED RETICULOCYTE COUNT: CPT | Performed by: INTERNAL MEDICINE

## 2025-03-12 ENCOUNTER — TRANSCRIBE ORDERS (OUTPATIENT)
Dept: OTHER | Age: 65
End: 2025-03-12

## 2025-03-12 DIAGNOSIS — Z12.11 COLON CANCER SCREENING: ICD-10-CM

## 2025-03-12 DIAGNOSIS — K21.9 GASTROESOPHAGEAL REFLUX DISEASE, UNSPECIFIED WHETHER ESOPHAGITIS PRESENT: Primary | ICD-10-CM

## 2025-03-13 LAB
BASOPHILS # BLD AUTO: 0 10E3/UL (ref 0–0.2)
BASOPHILS NFR BLD AUTO: 1 %
ELLIPTOCYTES BLD QL SMEAR: SLIGHT
EOSINOPHIL # BLD AUTO: 0.1 10E3/UL (ref 0–0.7)
EOSINOPHIL NFR BLD AUTO: 2 %
ERYTHROCYTE [DISTWIDTH] IN BLOOD BY AUTOMATED COUNT: 16 % (ref 10–15)
HCT VFR BLD AUTO: 31.5 % (ref 35–47)
HGB BLD-MCNC: 9.5 G/DL (ref 11.7–15.7)
IMM GRANULOCYTES # BLD: 0 10E3/UL
IMM GRANULOCYTES NFR BLD: 0 %
LYMPHOCYTES # BLD AUTO: 2.7 10E3/UL (ref 0.8–5.3)
LYMPHOCYTES NFR BLD AUTO: 34 %
MCH RBC QN AUTO: 19.3 PG (ref 26.5–33)
MCHC RBC AUTO-ENTMCNC: 30.2 G/DL (ref 31.5–36.5)
MCV RBC AUTO: 64 FL (ref 78–100)
MONOCYTES # BLD AUTO: 0.6 10E3/UL (ref 0–1.3)
MONOCYTES NFR BLD AUTO: 8 %
NEUTROPHILS # BLD AUTO: 4.3 10E3/UL (ref 1.6–8.3)
NEUTROPHILS NFR BLD AUTO: 55 %
NRBC # BLD AUTO: 0 10E3/UL
NRBC BLD AUTO-RTO: 0 /100
PATH REV: ABNORMAL
PLAT MORPH BLD: ABNORMAL
PLATELET # BLD AUTO: 308 10E3/UL (ref 150–450)
POLYCHROMASIA BLD QL SMEAR: SLIGHT
RBC # BLD AUTO: 4.93 10E6/UL (ref 3.8–5.2)
RBC MORPH BLD: ABNORMAL
WBC # BLD AUTO: 7.9 10E3/UL (ref 4–11)

## 2025-04-02 NOTE — NURSING NOTE
Chief Complaints and History of Present Illnesses   Patient presents with     Visual Field Defect Follow Up     Chief Complaint(s) and History of Present Illness(es)     Visual Field Defect Follow Up     Laterality: both eyes              Comments     Pt here today for retinal follow up.  States no changes to report, had an exam here 6/4/22 with Dr. Vo.  Pt denies any pain or discomfort.    Ocular meds = none    Vj MONAHAN, KATIUSKA 2:24 PM 06/09/2022                        Updated family at bedside regarding plan of care.   Patient had UTI that was diagnosed outpatient over the weekend grew Ecoli and started on Levaquin on Monday completed 2 doses. Will order one more dose for today.   As per daughter she was exhibiting confusion and had a feeling she had UTI since she had similar presentation in past.   Notified daughter we are waiting on cardiology consult and ECHO.

## 2025-05-07 ENCOUNTER — TELEPHONE (OUTPATIENT)
Dept: GASTROENTEROLOGY | Facility: CLINIC | Age: 65
End: 2025-05-07
Payer: COMMERCIAL

## 2025-05-07 NOTE — TELEPHONE ENCOUNTER
"Endoscopy Scheduling Screen    Caller: patient    Have you had any respiratory illness or flu-like symptoms in the last 10 days?  No    What is your communication preference for Instructions and/or Bowel Prep?   Mail/USPS    What insurance is in the chart?  Other:  BCBS    Ordering/Referring Provider: Donavon Schwartz MD     (If ordering provider performs procedure, schedule with ordering provider unless otherwise instructed. )    BMI: Estimated body mass index is 25.44 kg/m  as calculated from the following:    Height as of 1/20/23: 1.58 m (5' 2.21\").    Weight as of 1/20/23: 63.5 kg (140 lb).     Sedation Ordered  moderate sedation.   If patient BMI > 50 do not schedule in [facility].    If patient BMI > 45 do not schedule at [facility].    Are you taking methadone or Suboxone?  NO, No RN review required.    Have you been diagnosed and are being treated for severe PTSD or severe anxiety?  NO, No RN review required.    Are you taking any prescription medications for pain 3 or more times per week?   NO, No RN review required.    Do you have a history of malignant hyperthermia?  No    (Females) Are you currently pregnant?   No     Have you been diagnosed or told you have pulmonary hypertension?   No    Do you have an LVAD?  No    Have you been told you have moderate to severe sleep apnea?  No.    Have you been told you have COPD, asthma, or any other lung disease?  Yes     What breathing problems do you have?  Asthma     Do you use home oxygen?  No    Have your breathing problems required an ED visit or hospitalization in the last year?  No.    Has your doctor ordered any cardiac tests like echo, angiogram, stress test, ablation, or EKG, that you have not completed yet?  No    Do you  have a history of any heart conditions?  Yes     Have you had any hospitalizations  in the last year for heart related issues, for example a stent placement, heart attack, or cardiomyopathy?  No    Do you have any implantable devices in your body " "(pacemaker, ICD)?  No    Do you take nitroglycerine?  No    Have you ever had or are you waiting for an organ transplant?  No. Continue scheduling, no site restrictions.    Have you had a stroke or transient ischemic attack (TIA aka \"mini stroke\") in the last 2 years?   No.    Have you been diagnosed with or been told you have cirrhosis of the liver?   No.    Are you currently on dialysis?   No    Do you need assistance transferring?   No    BMI: Estimated body mass index is 25.44 kg/m  as calculated from the following:    Height as of 1/20/23: 1.58 m (5' 2.21\").    Weight as of 1/20/23: 63.5 kg (140 lb).     Is patients BMI > 40 and scheduling location [facility]?  No    Do you take an injectable or oral medication for weight loss or diabetes (excluding insulin)?  No    Do you take the medication Naltrexone?  No    Do you take blood thinners?  No       Prep   Are you currently on dialysis or do you have chronic kidney disease?  No    Do you have a diagnosis of diabetes?  Yes (Golytely Prep)    Do you have a diagnosis of cystic fibrosis (CF)?  No    On a regular basis do you go 3 -5 days between bowel movements?  No    BMI > 40?  No    Preferred Pharmacy:    [facility]   [address] [place], [place] [id number] [phone]  Final Scheduling Details     Procedure scheduled  Colonoscopy / Upper endoscopy (EGD)    Surgeon:  yoel     Date of procedure:  6/13/25     Pre-OP / PAC:   No - Not required for this site.    Location  [facility] per pt location    Sedation   Moderate Sedation - Per RN assessment.      Patient Reminders:   You will receive a call from a Nurse to review instructions and health history.  This assessment must be completed prior to your procedure.  Failure to complete the Nurse assessment may result in the procedure being cancelled.      On the day of your procedure, please designate an adult(s) who can drive you home stay with you for the next 24 hours. The medicines used in the exam will make " you sleepy. You will not be able to drive.      You cannot take public transportation, ride share services, or non-medical taxi service without a responsible caregiver.  Medical transport services are allowed with the requirement that a responsible caregiver will receive you at your destination.  We require that drivers and caregivers are confirmed prior to your procedure.

## 2025-05-08 NOTE — TELEPHONE ENCOUNTER
Caller: Maria T    Pt reached out to r/s procedure but was unsure which location to r/s to. Pt decided to keep current procedure date/time and location and will call back if she remembers .

## 2025-05-29 ENCOUNTER — TELEPHONE (OUTPATIENT)
Dept: GASTROENTEROLOGY | Facility: CLINIC | Age: 65
End: 2025-05-29
Payer: COMMERCIAL

## 2025-05-29 DIAGNOSIS — Z12.11 SPECIAL SCREENING FOR MALIGNANT NEOPLASMS, COLON: Primary | ICD-10-CM

## 2025-05-29 RX ORDER — BISACODYL 5 MG/1
TABLET, DELAYED RELEASE ORAL
Qty: 4 TABLET | Refills: 0 | Status: SHIPPED | OUTPATIENT
Start: 2025-05-29

## 2025-05-29 NOTE — TELEPHONE ENCOUNTER
Attempted to contact patient via  in order to complete pre assessment questions.     No answer. Left message to return call to [phone] option 3.    Callback communication sent via [employer].    Tyesha Humphreys LPN

## 2025-05-29 NOTE — TELEPHONE ENCOUNTER
Pre visit planning completed.      Procedure details:    Patient scheduled for Colonoscopy/Upper endoscopy (EGD) on 6.13.25.     Arrival time: 1130. Procedure time 1230    Facility location: [facility]; [address] [place] [address], [address] Floor, [place] [address]. Check in location: Main entrance at registration desk.    Sedation type: Conscious sedation     Pre op exam needed? No.    Indication for procedure: screening;GERD      Chart review:     Electronic implanted devices? No    Recent diagnosis of diverticulitis within the last 6 weeks? No      Medication review:    Diabetic? Yes. Not on diabetic medication    Anticoagulants? No    Weight loss medication/injectable? No.    Other medication HOLDING recommendations:  N/A      Prep for procedure:     Bowel prep recommendation: Standard Golytely. Bowel prep sent to      [id number] - [place], [place] - [address] [place] [address].  Due to: language barrier    Procedure information and instructions sent via letter         Sammie Mccray RN  Endoscopy Procedure Pre Assessment   [phone] option 3

## 2025-06-13 ENCOUNTER — HOSPITAL ENCOUNTER (OUTPATIENT)
Facility: CLINIC | Age: 65
Discharge: HOME | End: 2025-06-13
Attending: STUDENT IN AN ORGANIZED HEALTH CARE EDUCATION/TRAINING PROGRAM | Admitting: STUDENT IN AN ORGANIZED HEALTH CARE EDUCATION/TRAINING PROGRAM
Payer: COMMERCIAL

## 2025-06-13 VITALS
DIASTOLIC BLOOD PRESSURE: 77 MMHG | HEART RATE: 62 BPM | SYSTOLIC BLOOD PRESSURE: 120 MMHG | RESPIRATION RATE: 11 BRPM | OXYGEN SATURATION: 100 %

## 2025-06-13 LAB
COLONOSCOPY: NORMAL
GLUCOSE BLDC GLUCOMTR-MCNC: 119 MG/DL (ref 70–99)
UPPER GI ENDOSCOPY: NORMAL

## 2025-06-13 PROCEDURE — 99153 MOD SED SAME PHYS/QHP EA: CPT | Performed by: STUDENT IN AN ORGANIZED HEALTH CARE EDUCATION/TRAINING PROGRAM

## 2025-06-13 PROCEDURE — 82962 GLUCOSE BLOOD TEST: CPT

## 2025-06-13 PROCEDURE — 43235 EGD DIAGNOSTIC BRUSH WASH: CPT | Performed by: STUDENT IN AN ORGANIZED HEALTH CARE EDUCATION/TRAINING PROGRAM

## 2025-06-13 PROCEDURE — G0500 MOD SEDAT ENDO SERVICE >5YRS: HCPCS | Performed by: STUDENT IN AN ORGANIZED HEALTH CARE EDUCATION/TRAINING PROGRAM

## 2025-06-13 PROCEDURE — 250N000011 HC RX IP 250 OP 636: Mod: JZ | Performed by: STUDENT IN AN ORGANIZED HEALTH CARE EDUCATION/TRAINING PROGRAM

## 2025-06-13 PROCEDURE — G0121 COLON CA SCRN NOT HI RSK IND: HCPCS | Performed by: STUDENT IN AN ORGANIZED HEALTH CARE EDUCATION/TRAINING PROGRAM

## 2025-06-13 PROCEDURE — 45378 DIAGNOSTIC COLONOSCOPY: CPT | Performed by: STUDENT IN AN ORGANIZED HEALTH CARE EDUCATION/TRAINING PROGRAM

## 2025-06-13 RX ORDER — FENTANYL CITRATE 50 UG/ML
INJECTION, SOLUTION INTRAMUSCULAR; INTRAVENOUS PRN
Status: DISCONTINUED | OUTPATIENT
Start: 2025-06-13 | End: 2025-06-13 | Stop reason: HOSPADM

## 2025-06-13 RX ORDER — NALOXONE HYDROCHLORIDE 0.4 MG/ML
0.4 INJECTION, SOLUTION INTRAMUSCULAR; INTRAVENOUS; SUBCUTANEOUS
Status: CANCELLED | OUTPATIENT
Start: 2025-06-13

## 2025-06-13 RX ORDER — LIDOCAINE 40 MG/G
CREAM TOPICAL
Status: DISCONTINUED | OUTPATIENT
Start: 2025-06-13 | End: 2025-06-13 | Stop reason: HOSPADM

## 2025-06-13 RX ORDER — PROCHLORPERAZINE MALEATE 10 MG
10 TABLET ORAL EVERY 6 HOURS PRN
Status: CANCELLED | OUTPATIENT
Start: 2025-06-13

## 2025-06-13 RX ORDER — NALOXONE HYDROCHLORIDE 0.4 MG/ML
0.2 INJECTION, SOLUTION INTRAMUSCULAR; INTRAVENOUS; SUBCUTANEOUS
Status: CANCELLED | OUTPATIENT
Start: 2025-06-13

## 2025-06-13 RX ORDER — ONDANSETRON 2 MG/ML
4 INJECTION INTRAMUSCULAR; INTRAVENOUS
Status: DISCONTINUED | OUTPATIENT
Start: 2025-06-13 | End: 2025-06-13 | Stop reason: HOSPADM

## 2025-06-13 RX ORDER — ONDANSETRON 4 MG/1
4 TABLET, ORALLY DISINTEGRATING ORAL EVERY 6 HOURS PRN
Status: CANCELLED | OUTPATIENT
Start: 2025-06-13

## 2025-06-13 RX ORDER — ONDANSETRON 2 MG/ML
4 INJECTION INTRAMUSCULAR; INTRAVENOUS EVERY 6 HOURS PRN
Status: CANCELLED | OUTPATIENT
Start: 2025-06-13

## 2025-06-13 RX ORDER — FLUMAZENIL 0.1 MG/ML
0.2 INJECTION, SOLUTION INTRAVENOUS
Status: CANCELLED | OUTPATIENT
Start: 2025-06-13 | End: 2025-06-13

## 2025-06-13 ASSESSMENT — ACTIVITIES OF DAILY LIVING (ADL)
ADLS_ACUITY_SCORE: 41

## 2025-06-13 NOTE — LETTER
[facility] ENDOSCOPY  [address] [id number]  [phone]          May 29, 2025    Maria T Maria M Miriam                                                                                                                     [address]  [facility] [address]            Dear Maria T,    Colonoscopy/Upper endoscopy (EGD)     Procedure date: 6.13.25    Anticipated arrival time: 1130 AM   (Please note that arrival times may change)    Facility location: [facility]; [address], [place] [address] - Check in location: Main entrance at registration desk. Parking information: Self pay parking available in the Patient & Visitor Ramp on the corner of  [facility] and [facility] [facility].  options are available 24 hours for patients with mobility limitations.     Important Procedure Reminders:     Prep Instructions:   Instructions on how to prepare for your upcoming procedure are found below. Please read instructions carefully. Deviation from instructions may result in less than desired outcomes and procedure may need to be rescheduled.   If you have additional questions regarding how to prepare for your upcoming procedure, contact our endoscopy pre assessment nurses at [phone] option 3 Monday through Friday 7:00am-5:00pm.      Policy:   The medications used during the procedure will make you sleepy, so you won't be able to drive. On the day of your procedure, please have an adult ready to drive you home and stay with you for the next 6 hours.   You can't use public transportation, ride-share services, or non-medical taxi services without a responsible caregiver. Medical transport services are okay, but a caregiver must be there to receive you at your destination.   Make sure your  and caregiver are confirmed before your procedure.    Day of procedure:  Please keep in mind that arrival times may change. Let your  know there might be a one-hour window  for changes.  We ask that you please check in at the  with your . Your  should remain on campus.  Expect to be at the procedure center for about 1.5-2.5 hours.    Please do not wear jewelry (i.e. earrings, rings, necklaces, watches, etc). Leave your purse, billfold, credit cards, and other valuables at home.   Bring insurance card and ID.     To cancel or reschedule your procedure:   If you need to cancel or reschedule, our endoscopy scheduling team can be reached at [phone], option 1. Monday through Friday, 7:00am-5:00pm.    Medication Reminders:    Please note the following medication holding recommendations:   N/A    ---------------------------------------------------------------------------------------------------------------------------------------------------------------------------------------------------------------    Bowel prep has been sent to        [id number] - [place], [place] - [address]    Standard Golytely (Colyte, Nulytely) Bowel Prep   Prep instructions for your colonoscopy     [facility]; [address] , [place] [address] - Check in location: Main entrance at registration desk.   For prep questions, please call: [facility] - [phone] option 3  Website (printable PDF version): [email or web address]    Please read these instructions carefully at least 7 days prior to your colonoscopy procedure. Be sure to follow all directions completely. The inside of your colon must be clean to allow for a complete examination for the presence of any growths, polyps, and/or abnormalities, as well as their biopsy or removal. A number of tips are included in order to make this part of the procedure as comfortable as possible.    Getting ready    prescription at the pharmacy. Endoscopy team will order this about 2 weeks before your scheduled procedure.  Included in the kit will be the followin Dulcolax (Bisacodyl) 5mg tablets  1 container of Polyethylene Glycol (Golytely, Colyte, Nulytely, Gavilyte-G)    A nurse will call you to go over your appointment details and prep instructions.    You must arrange for an adult to drive you home after your exam. Your colonoscopy cannot be done unless you have a ride. If you need to use public transportation, someone must ride with you and stay with you for up to 24 hours.       7 days before procedure   Medications that may need to be held before procedure:     GLP-1 agonist medication for diabetes or weight loss: such as Mounjaro (Tirzepatide).  Ozempic (Semaglutide). Rybelsus (Semaglutide), Tirzepatide-Weight Management (Zepbound), Wegovy (Semaglutide) or others, holding times may vary based on how you take this medication. This may be up to a 7 day hold. Our pre assessment nurses will call and discuss holding recommendations 1-2 weeks before scheduled procedure.     Blood thinning and/or anti platelet medications: such as Coumadin, Plavix, Xarelto, Eliquis, Lovenox or others, ask your your prescribing provider about holding recommendations.     If you take insulin for diabetes, ask your prescribing provider for instructions on how to manage this medication while preparing for a colonoscopy.     Stop taking iron (ferrous sulfate), multivitamins that contain iron, and/or fiber supplements (Metamucil, Benefiber, Psyllium husk powder, Fibercon, Bran, etc.) 7 days before procedure.     Stop eating whole kernel corn, popcorn, nuts, and foods that contain seeds. These can stay in the colon for many days and they can clog up the colonoscope.       3 days before procedure     Begin a low-fiber diet (see examples below). No Olestra (a fat substitute).    Consume no more than 10-15 grams of fiber each day.     It is important to stay hydrated. Drink at least eight 8-ounce glasses of water a day.      LOW FIBER DIET   You can  have:   Do not have:    Starches: White bread, rolls, biscuits, croissants, Janice toast, white flour tortillas, waffles, pancakes, Tristanian toast; white rice, noodles, pasta, macaroni; cooked and peeled potatoes; plain crackers, saltines; cooked farina or cream of rice; puffed rice, corn flakes, Rice Krispies, Special K      Vegetables: tender cooked and canned, vegetable broths     Fruits and fruit juices: Strained fruit juice, canned fruit without seeds or skin (not pineapple), applesauce, pear sauce, ripe bananas, melons (not watermelon)     Milk products: Milk (plain or flavored), cheese, cottage cheese, yogurt (no berries), custard, ice cream       Proteins: Tender, well-cooked ground beef, lamb, veal, ham, pork, chicken, turkey, fish or organ meat, Tofu, eggs, creamy peanut butter      Fats and condiments:  Margarine, butter, oils, mayonnaise, sour cream, salad dressing, plain gravy; spices, cooked herbs; sugar, clear jelly, honey, syrup      Snacks, sweets and drinks: Pretzels, hard candy; plain cakes and cookies (no nuts or seeds); gelatin, plain pudding, sherbet, Popsicles; coffee, tea, carbonated ( fizzy ) drinks    Starches: Breads or rolls that contain nuts, seeds or fruit; whole wheat or whole grain breads that contain more than 2 grams of fiber per serving; cornbread; corn or whole wheat tortillas; potatoes with skin; brown rice, wild rice, quinoa, kasha (buckwheat), and oatmeal      Vegetables: Any raw or steamed vegetables; vegetables with seeds; corn in any form      Fruits and fruit juices: Prunes, prune juice, raisins and other dried fruits, berries and other fruits with seeds, canned pineapple juices with pulp such as orange, grapefruit, pineapple or tomato juice     Milk products: Any yogurt with nuts, seeds or berries      Proteins: Tough, fibrous meats with gristle; cooked dried beans, peas or lentils; crunchy peanut butter     Fats and condiments: Pickles, olives, relish, horseradish; jam,  marmalade, preserves      Snacks, sweets and drinks: Popcorn, nuts, seeds, granola, coconut, candies made with nuts or seeds; all desserts that contain nuts, seeds, raisins and other dried fruits, coconut, whole grains or bran.                                               1 day before procedure     You can have a light, low-fiber breakfast. But drink only clear liquids after 9 a.m. (see list below).    Drink at least eight to ten 8-ounce glasses of water throughout the day. ? ? ? ? ? ? ? ?    Fill the container of Golytely with a full gallon of warm water. Cover and shake until well mixed. Chill for 3 hours in refrigerator until it is time to drink solution.    CLEAR LIQUID DIET:  You can have: Do not have:    Water, tea, coffee (no milk or cream)   Soda pop, Gatorade (not red or purple)   Coconut water   Jell-O, Popsicles (no milk or fruit pieces - not red or purple)   Fat-free soup broth or bouillon   Plain hard candy, such as clear life savers (not red or purple)   Clear juices and fruit-flavored drinks, such as apple juice, white grape juice, Hi-C, and Morgan-Aid (not red or purple)  Milk or milk products such as ice cream, malts or shakes, or coffee creamer   Red or purple drinks of any kind such as cranberry juice, grape juice or Morgan-Aid. Avoid red or purple Jell-O, Popsicles, sorbet, sherbet and candy   Juices with pulp such as orange, grapefruit, pineapple or tomato juice   Cream soups of any kind   Alcohol and beer   Protein drinks or protein powder     Step 1     At 3 PM, take 2 Dulcolax (Bisacodyl) tablets.   At 6 PM, start drinking one 8-ounce glass of Golytely mixture every 15 minutes until the container is HALF empty. Drink each glass quickly. Store the rest in the refrigerator.   Continue to drink clear liquids    Step 2     If you arrive for your procedure BEFORE 11 AM:  At 11 PM, take 2 Dulcolax (Bisacodyl) tablets  At 11 PM, start drinking the other half of the Golytely container. Drink one 8-ounce  glass every 15 minutes until the container is empty. Drink each glass quickly.    If you arrive for your procedure AFTER 11 AM:  At 6 AM, take 2 Dulcolax (Bisacodyl) tablets  At 6 AM, start drinking the other half of the Golytely container. Drink one 8-ounce glass of Golytely mixture every 15 minutes until the container is empty. Drink each glass quickly.       Reminders While Drinking Laxatives:     After you start drinking the solution, stay near a toilet. You may have watery stools (diarrhea), mild cramping, bloating, and nausea. You may want to use Vaseline on the skin around your anus after each bowel movement or use wet wipes to prevent irritation. Bowel movements will be liquid and dark in color at first and then should turn clear yellow in color. Drinking the prepared solution may make you cold, wearing warm clothing can be helpful.    Some find it helpful to:  For added flavor, include a crystal light lemonade packet in each glass of Golytely, rather than mixing it into the entire prepared mixture.   In between each glass, try sucking on a lemon or a piece of hard candy to help diminish the flavor of the preparation.  Drinking from a straw can help minimize the taste of the prepared mixture.    If you have nausea or vomiting during drinking the solution, rinse your mouth with water and take a 15-30 minute break and then continue drinking solution.     Day of procedure     2 hours before your arrival time stop drinking all liquids, including water.   Do not smoke or swallow anything, including water or gum for at least 2 hours before your arrival time. This is a safety issue. Your procedure could be cancelled if you do not follow directions.  No chewing tobacco 6 hours prior to procedure arrival time.     You may take your necessary morning medications with sips of water (4 ounces).   Do not take diabetes medicine by mouth until after your exam.  If you have asthma, bring your inhalers.  Please perform your  nebulizer treatments and airway clearance therapy in the morning prior to the procedure (if applicable).    Arrive with a responsible adult who can drive you home and stay with you for up to 24 hours. The medications used during the procedure will make you sleepy, so you won't be able to drive yourself home.   You cannot use public transportation, ride-share services, or non-medical taxi services without a responsible caregiver. Medical transport services are okay, but a caregiver must be there to receive you at your destination.  Please check in with your  when you arrive. Drivers should stay on campus.    Expect to be at the procedure center for about 1.5-2.5 hours.    Do not wear jewelry (i.e. earrings, rings, necklaces, watches, etc.). Leave your purse, billfold, credit cards, and other valuables at home.      Bring insurance card and ID.       Answers to Commonly Asked Questions     How soon can I eat after the procedure?  You may resume your normal diet when you feel ready, unless advised otherwise by the doctor performing your procedure. We recommend starting with a light meal.   Do not drink alcohol for 24 hours after your procedure.  You may resume normal activities (work, exercise, etc.) after 24 hours.    How will I feel after the procedure?  It is normal to feel bloated and gassy after your procedure. Walking will help move the air through your colon. You can take non-aspirin pain relievers that contain acetaminophen (Tylenol).  If you are having sedation, we require a responsible adult to take you home for your safety. The sedation medicines used to relax you during the procedure can impair your judgement and reaction time, and make you forgetful and possibly a little unsteady.  Do not drive, make any important decisions, or sign any legal documents for 24 hours after your procedure.    When will I get my test results?  You should have your procedure results and any lab results (if applicable) by  letter, MyChart message, or phone call within 2 weeks. If you have any questions, please call the doctor that referred you for the procedure.    How do I know if my colon is cleaned out?   After completing the bowel prep, your bowel movements should be all liquid and yellow. Your bowel movements will look similar to urine in the toilet. If there are pieces of stool (poop) in the toilet, or if you can't see to the bottom of the toilet, please call our office for advice. Call [phone] and ask to speak with a nurse.    Why is the Golytely bowel prep taken in several steps?   The stool is flushed out by a large wave of fluid going through the colon. Just sipping a large volume of the solution will not achieve the desired result. Studies have shown that two smaller waves (or more in some cases) are better than one large one.      What if I need to cancel or reschedule my procedure?  Contact our endoscopy scheduling team at [phone], option 1. Monday through Friday, 7:00am-5:00pm.

## 2025-06-13 NOTE — H&P
"[facility]  [id number]  female  65 year old      Reason for procedure/surgery: screening and GERD    There is no problem list on file for this patient.      Past Surgical History:    Past Surgical History:   Procedure Laterality Date    hemorrhoid surgery       Pt describes a procedure performed in [place] to remove hemorrhoids. Possibly a colonoscopy?       Past Medical History:   Past Medical History:   Diagnosis Date    Asthma     Pt describes having asthma and taking \"nasal spray\" daily for relief     Chronic knee pain     bilateral, takes ibuprofen for the pain     Diabetes (H)     History of kidney problems     diagnosed and treated herbally in [place] s/p resolved per pt        Social History:   Social History     Tobacco Use    Smoking status: Never    Smokeless tobacco: Never   Substance Use Topics    Alcohol use: Never       Family History:   Family History   Problem Relation Age of Onset    Lung Cancer Father     Ovarian Cancer Paternal Aunt     Glaucoma No family hx of     Macular Degeneration No family hx of        Allergies: No Known Allergies    Active Medications:   No current outpatient medications on file.       Systemic Review:   CONSTITUTIONAL: NEGATIVE for fever, chills, change in weight  ENT/MOUTH: NEGATIVE for ear, mouth and throat problems  RESP: NEGATIVE for significant cough or SOB  CV: NEGATIVE for chest pain, palpitations or peripheral edema    Physical Examination:   Vital Signs: BP 94/67   Pulse 61   Resp 12   SpO2 99%   GENERAL: healthy, alert and no distress  NECK: no adenopathy, no asymmetry, masses, or scars  RESP: lungs clear to auscultation - no rales, rhonchi or wheezes  CV: regular rate and rhythm, normal S1 S2, no S3 or S4, no murmur, click or rub, no peripheral edema and peripheral pulses strong  ABDOMEN: soft, nontender, no hepatosplenomegaly, no masses and bowel sounds normal  MS: no gross musculoskeletal defects noted, no edema    Plan: Appropriate to proceed as " scheduled.      Marvin Acosta MD  6/13/2025    PCP:  No Ref-Primary, Physician

## 2025-06-16 ENCOUNTER — TELEPHONE (OUTPATIENT)
Dept: GASTROENTEROLOGY | Facility: CLINIC | Age: 65
End: 2025-06-16
Payer: COMMERCIAL

## 2025-06-16 ENCOUNTER — LAB REQUISITION (OUTPATIENT)
Dept: LAB | Facility: CLINIC | Age: 65
End: 2025-06-16

## 2025-06-16 DIAGNOSIS — Z12.11 SPECIAL SCREENING FOR MALIGNANT NEOPLASMS, COLON: Primary | ICD-10-CM

## 2025-06-16 DIAGNOSIS — E11.9 TYPE 2 DIABETES MELLITUS WITHOUT COMPLICATIONS (H): ICD-10-CM

## 2025-06-16 LAB
ALBUMIN SERPL BCG-MCNC: 4.5 G/DL (ref 3.5–5.2)
ALP SERPL-CCNC: 53 U/L (ref 40–150)
ALT SERPL W P-5'-P-CCNC: 23 U/L (ref 0–50)
ANION GAP SERPL CALCULATED.3IONS-SCNC: 16 MMOL/L (ref 7–15)
AST SERPL W P-5'-P-CCNC: 22 U/L (ref 0–45)
BILIRUB SERPL-MCNC: 0.6 MG/DL
BUN SERPL-MCNC: 16.1 MG/DL (ref 8–23)
CALCIUM SERPL-MCNC: 9.5 MG/DL (ref 8.8–10.4)
CHLORIDE SERPL-SCNC: 102 MMOL/L (ref 98–107)
CREAT SERPL-MCNC: 0.59 MG/DL (ref 0.51–0.95)
EGFRCR SERPLBLD CKD-EPI 2021: >90 ML/MIN/1.73M2
GLUCOSE SERPL-MCNC: 165 MG/DL (ref 70–99)
HCO3 SERPL-SCNC: 19 MMOL/L (ref 22–29)
POTASSIUM SERPL-SCNC: 4.3 MMOL/L (ref 3.4–5.3)
PROT SERPL-MCNC: 7.2 G/DL (ref 6.4–8.3)
SODIUM SERPL-SCNC: 137 MMOL/L (ref 135–145)

## 2025-06-16 PROCEDURE — 80053 COMPREHEN METABOLIC PANEL: CPT | Performed by: INTERNAL MEDICINE

## (undated) RX ORDER — TRIAMCINOLONE ACETONIDE 40 MG/ML
INJECTION, SUSPENSION INTRA-ARTICULAR; INTRAMUSCULAR
Status: DISPENSED
Start: 2022-08-05

## (undated) RX ORDER — METOPROLOL TARTRATE 1 MG/ML
INJECTION, SOLUTION INTRAVENOUS
Status: DISPENSED
Start: 2021-09-16

## (undated) RX ORDER — FENTANYL CITRATE 50 UG/ML
INJECTION, SOLUTION INTRAMUSCULAR; INTRAVENOUS
Status: DISPENSED
Start: 2025-06-13

## (undated) RX ORDER — BUPIVACAINE HYDROCHLORIDE 2.5 MG/ML
INJECTION, SOLUTION INFILTRATION; PERINEURAL
Status: DISPENSED
Start: 2022-08-05

## (undated) RX ORDER — ATROPINE SULFATE 0.4 MG/ML
AMPUL (ML) INJECTION
Status: DISPENSED
Start: 2021-09-16

## (undated) RX ORDER — LIDOCAINE HYDROCHLORIDE 10 MG/ML
INJECTION, SOLUTION EPIDURAL; INFILTRATION; INTRACAUDAL; PERINEURAL
Status: DISPENSED
Start: 2022-08-05

## (undated) RX ORDER — DOBUTAMINE HYDROCHLORIDE 200 MG/100ML
INJECTION INTRAVENOUS
Status: DISPENSED
Start: 2021-09-16